# Patient Record
Sex: MALE | Race: WHITE | NOT HISPANIC OR LATINO | Employment: FULL TIME | URBAN - METROPOLITAN AREA
[De-identification: names, ages, dates, MRNs, and addresses within clinical notes are randomized per-mention and may not be internally consistent; named-entity substitution may affect disease eponyms.]

---

## 2017-01-27 ENCOUNTER — ALLSCRIPTS OFFICE VISIT (OUTPATIENT)
Dept: OTHER | Facility: OTHER | Age: 32
End: 2017-01-27

## 2017-06-14 ENCOUNTER — ALLSCRIPTS OFFICE VISIT (OUTPATIENT)
Dept: OTHER | Facility: OTHER | Age: 32
End: 2017-06-14

## 2017-10-16 ENCOUNTER — ALLSCRIPTS OFFICE VISIT (OUTPATIENT)
Dept: OTHER | Facility: OTHER | Age: 32
End: 2017-10-16

## 2017-10-18 NOTE — PROGRESS NOTES
Assessment  1  Fibromyalgia (729 1) (M79 7)   2  Elevated blood pressure reading (796 2) (R03 0)    Plan  Fibromyalgia    · Follow-up visit in 3 months Evaluation and Treatment  Follow-up  Status: Hold For -  Scheduling  Requested for: [id number]  SocHx: Current some day smoker    · Chantix Continuing Month Willie 1 MG Oral Tablet    Discussion/Summary    Discussed patient's fibromyalgia as medications in detail today  He is well controlled on current regimen and he will call when he needs any more refills  Regarding his elevated blood pressure, he has a family history and has recently gained weight and so I told him to watch with his diet as well as the need for exercise and weight loss to help to decrease his risk and he has eliminated the risk factor of tobacco use which I commended him for  We will monitor his blood pressure more closely and re-evaluate him when he is next due to follow up in the office in 6 months, sooner kathleen Fam Possible side effects of new medications were reviewed with the patient/guardian today  The treatment plan was reviewed with the patient/guardian  The patient/guardian understands and agrees with the treatment plan      Chief Complaint  Patient presents for medication check  Patient states he does not need any refills on medications  Patient is not longer taking Chantix   Patient is here today for follow up of chronic conditions described in HPI  History of Present Illness  Patient presents for follow-up of fibromyalgia  He is feeling well overall today without any specific complaints  He is taking duloxetine, tramadol, and Vicodin for the symptoms and he has no issues and does not need any refills today  He quit smoking approximately 2 months ago on Chantix  He still smokes an occasional cigar  He was not aware until today that he has gained some weight  Review of Systems    Constitutional: No fever or chills, feels well, no tiredness, no recent weight gain or weight loss  Cardiovascular: No complaints of slow heart rate, no fast heart rate, no chest pain, no palpitations, no leg claudication, no lower extremity  Respiratory: No complaints of shortness of breath, no wheezing, no cough, no SOB on exertion, no orthopnea or PND  Gastrointestinal: No complaints of abdominal pain, no constipation, no nausea or vomiting, no diarrhea or bloody stools  Genitourinary: No complaints of dysuria, no incontinence, no hesitancy, no nocturia, no genital lesion, no testicular pain  Musculoskeletal: No complaints of arthralgia, no myalgias, no joint swelling or stiffness, no limb pain or swelling  Active Problems  1  Asthma (493 90) (J45 909)   2  Starr-Danlos syndrome (756 83) (Q79 6)   3  Fibromyalgia (729 1) (M79 7)    Past Medical History  1  History of Encounter for smoking cessation counseling (V65 42,305 1) (Z71 6,Z72 0)   2  History of Fatigue (780 79) (R53 83)   3  History of Flu vaccine need (V04 81) (Z23)   4  History of abdominal pain (V13 89) (Z87 898)   5  History of nausea (V12 79) (Z87 898)   6  History of Laceration of right side of back, initial encounter (876 0) (S21 211A)   7  History of Laceration of right side of back, subsequent encounter (V58 89,876 0)   (S21 211D)   8  History of Need for diphtheria-tetanus-pertussis (Tdap) vaccine, adult/adolescent (V06 1)   (Z23)   9  History of Skin lesion (709 9) (L98 9)   10  History of Visit For:   6  History of Visit For:    Family History  Maternal Grandmother    1  Family history of hypertension (V17 49) (Z82 49)    Social History   · Current some day smoker (305 1) (F17 200)   · Social alcohol use (Z78 9)  The social history was reviewed and is unchanged  Current Meds   1  Benadryl 25 MG TABS; TAKE 1 TABLET AT BEDTIME; Therapy: [id number] to Recorded   2  Chantix Continuing Month Willie 1 MG Oral Tablet; take one tablet by mouth twice daily;    Therapy: 11WIR8917 to (96 198752)  Requested for: 10Aug2017; Last   Rx:10Aug2017 Ordered   3  DULoxetine HCl - 60 MG Oral Capsule Delayed Release Particles; TAKE 1 CAPSULE   DAILY; Therapy: 55TIO8033 to (Sloan Castro)  Requested for: 10Aug2017; Last   Rx:10Aug2017 Ordered   4  Excedrin Migraine 250-250-65 MG Oral Tablet; TAKE 3 TABLET Daily; Therapy: 54GVZ6192 to Recorded   5  Hydrocodone-Acetaminophen  MG Oral Tablet; TAKE 1 TABLET EVERY 4 TO 6   HOURS AS NEEDED FOR PAIN;   Therapy: 15Pzc4443 to (Evaluate:19Oct2017); Last Rx:11Oct2017 Ordered   6  TraMADol HCl - 50 MG Oral Tablet; TAKE 2 TABLET Every 6 hours PRN; Therapy: 70NCE8155 to (Evaluate:10Nov2017)  Requested for: 00XFE8963; Last   Rx:11Oct2017 Ordered   7  Ventolin  (90 Base) MCG/ACT Inhalation Aerosol Solution; INHALE 1 TO 2 PUFFS   EVERY 4 TO 6 HOURS AS NEEDED; Therapy: 21FRV9796 to (Last IV:23BDF9683)  Requested for: 77FBF7307 Ordered    The medication list was reviewed and updated today  Allergies  1  Clindamycin   2  Erythromycin Base TABS    Vitals  Vital Signs    Recorded: 80MGM6675 04:20PM Recorded: 61PBM0658 04:05PM   Temperature  97 7 F, Tympanic   Heart Rate  86   Pulse Quality  Normal   Respiration Quality  Normal   Respiration  12   Systolic 855 609, RUE, Sitting   Diastolic 98 78, RUE, Sitting   Height  5 ft 9 69 in   Weight  194 lb 8 oz   BMI Calculated  28 16   BSA Calculated  2 06   O2 Saturation  96, RA   Pain Scale  0     Physical Exam    Constitutional   General appearance: No acute distress, well appearing and well nourished  Ears, Nose, Mouth, and Throat   Oropharynx: Normal with no erythema, edema, exudate or lesions  Pulmonary   Respiratory effort: No increased work of breathing or signs of respiratory distress  Auscultation of lungs: Clear to auscultation, equal breath sounds bilaterally, no wheezes, no rales, no rhonci  Cardiovascular   Auscultation of heart: Normal rate and rhythm, normal S1 and S2, without murmurs      Examination of extremities for edema and/or varicosities: Normal     Lymphatic   Palpation of lymph nodes in neck: No lymphadenopathy  Psychiatric   Orientation to person, place and time: Normal     Mood and affect: Normal     Additional Exam:  Vital signs reviewed; neck supple, no thyromegaly  Signatures   Electronically signed by :  Neville Bentley [facility]; Oct 16 2017  4:33PM EST                       (Author)    Electronically signed by : Bethanie Victoria DO; Oct 17 2017  5:56AM EST                       (Co-author)

## 2018-01-14 VITALS
HEIGHT: 60 IN | RESPIRATION RATE: 12 BRPM | WEIGHT: 186.38 LBS | BODY MASS INDEX: 36.59 KG/M2 | SYSTOLIC BLOOD PRESSURE: 128 MMHG | OXYGEN SATURATION: 97 % | DIASTOLIC BLOOD PRESSURE: 70 MMHG | HEART RATE: 99 BPM | TEMPERATURE: 96.5 F

## 2018-01-14 VITALS
OXYGEN SATURATION: 98 % | SYSTOLIC BLOOD PRESSURE: 144 MMHG | TEMPERATURE: 98.5 F | RESPIRATION RATE: 14 BRPM | BODY MASS INDEX: 26.51 KG/M2 | DIASTOLIC BLOOD PRESSURE: 88 MMHG | HEART RATE: 87 BPM | WEIGHT: 185.19 LBS | HEIGHT: 70 IN

## 2018-01-14 VITALS
RESPIRATION RATE: 12 BRPM | DIASTOLIC BLOOD PRESSURE: 98 MMHG | SYSTOLIC BLOOD PRESSURE: 136 MMHG | OXYGEN SATURATION: 96 % | BODY MASS INDEX: 27.84 KG/M2 | HEIGHT: 70 IN | WEIGHT: 194.5 LBS | TEMPERATURE: 97.7 F | HEART RATE: 86 BPM

## 2018-02-11 DIAGNOSIS — M79.7 FIBROMYALGIA: Primary | ICD-10-CM

## 2018-02-11 RX ORDER — TRAMADOL HYDROCHLORIDE 50 MG/1
100 TABLET ORAL EVERY 6 HOURS PRN
Qty: 240 TABLET | Refills: 0 | OUTPATIENT
Start: 2018-02-11 | End: 2018-03-15 | Stop reason: SDUPTHER

## 2018-02-11 RX ORDER — DULOXETIN HYDROCHLORIDE 60 MG/1
1 CAPSULE, DELAYED RELEASE ORAL DAILY
COMMUNITY
Start: 2014-09-09 | End: 2018-02-11 | Stop reason: SDUPTHER

## 2018-02-11 RX ORDER — TRAMADOL HYDROCHLORIDE 50 MG/1
2 TABLET ORAL EVERY 6 HOURS PRN
COMMUNITY
Start: 2015-02-12 | End: 2018-02-11 | Stop reason: SDUPTHER

## 2018-02-11 RX ORDER — DULOXETIN HYDROCHLORIDE 60 MG/1
60 CAPSULE, DELAYED RELEASE ORAL DAILY
Qty: 30 CAPSULE | Refills: 2 | Status: SHIPPED | OUTPATIENT
Start: 2018-02-11 | End: 2018-03-15 | Stop reason: SDUPTHER

## 2018-02-15 DIAGNOSIS — M79.7 FIBROMYALGIA: ICD-10-CM

## 2018-02-16 RX ORDER — DULOXETIN HYDROCHLORIDE 60 MG/1
CAPSULE, DELAYED RELEASE ORAL
Qty: 30 CAPSULE | Refills: 2 | OUTPATIENT
Start: 2018-02-16

## 2018-02-16 RX ORDER — TRAMADOL HYDROCHLORIDE 50 MG/1
TABLET ORAL
Qty: 240 TABLET | Refills: 0 | OUTPATIENT
Start: 2018-02-16

## 2018-03-15 ENCOUNTER — OFFICE VISIT (OUTPATIENT)
Dept: FAMILY MEDICINE CLINIC | Facility: CLINIC | Age: 33
End: 2018-03-15
Payer: COMMERCIAL

## 2018-03-15 VITALS
TEMPERATURE: 98.2 F | DIASTOLIC BLOOD PRESSURE: 88 MMHG | SYSTOLIC BLOOD PRESSURE: 136 MMHG | HEART RATE: 125 BPM | BODY MASS INDEX: 29.06 KG/M2 | HEIGHT: 69 IN | WEIGHT: 196.2 LBS | OXYGEN SATURATION: 97 %

## 2018-03-15 DIAGNOSIS — M79.7 FIBROMYALGIA: Primary | ICD-10-CM

## 2018-03-15 DIAGNOSIS — Q79.60 EHLERS-DANLOS SYNDROME: ICD-10-CM

## 2018-03-15 DIAGNOSIS — Z72.0 TOBACCO USE: ICD-10-CM

## 2018-03-15 PROBLEM — R03.0 ELEVATED BLOOD PRESSURE READING: Status: ACTIVE | Noted: 2017-10-16

## 2018-03-15 PROCEDURE — 99214 OFFICE O/P EST MOD 30 MIN: CPT | Performed by: PHYSICIAN ASSISTANT

## 2018-03-15 RX ORDER — ALBUTEROL SULFATE 90 UG/1
2 AEROSOL, METERED RESPIRATORY (INHALATION) AS NEEDED
COMMUNITY
Start: 2012-10-10 | End: 2020-07-13 | Stop reason: SDUPTHER

## 2018-03-15 RX ORDER — VARENICLINE TARTRATE 25 MG
KIT ORAL
Qty: 53 TABLET | Refills: 0 | Status: SHIPPED | OUTPATIENT
Start: 2018-03-15 | End: 2019-05-14

## 2018-03-15 RX ORDER — ACETAMINOPHEN 325 MG/1
TABLET ORAL
COMMUNITY
End: 2018-10-19 | Stop reason: SDUPTHER

## 2018-03-15 RX ORDER — DULOXETIN HYDROCHLORIDE 60 MG/1
60 CAPSULE, DELAYED RELEASE ORAL DAILY
Qty: 30 CAPSULE | Refills: 3 | Status: SHIPPED | OUTPATIENT
Start: 2018-03-15 | End: 2018-04-12 | Stop reason: SDUPTHER

## 2018-03-15 RX ORDER — ELETRIPTAN HYDROBROMIDE 40 MG/1
20 TABLET, FILM COATED ORAL
COMMUNITY
End: 2019-05-14

## 2018-03-15 RX ORDER — SUMATRIPTAN 50 MG/1
TABLET, FILM COATED ORAL
COMMUNITY
Start: 2011-01-10 | End: 2019-05-14

## 2018-03-15 RX ORDER — HYDROCODONE BITARTRATE AND ACETAMINOPHEN 10; 325 MG/1; MG/1
TABLET ORAL
Qty: 30 TABLET | Refills: 0 | Status: SHIPPED | OUTPATIENT
Start: 2018-03-15 | End: 2018-04-12 | Stop reason: SDUPTHER

## 2018-03-15 RX ORDER — ACETAMINOPHEN, ASPIRIN AND CAFFEINE 250; 250; 65 MG/1; MG/1; MG/1
3 TABLET, FILM COATED ORAL DAILY
COMMUNITY
Start: 2014-07-01

## 2018-03-15 RX ORDER — OMEPRAZOLE 20 MG/1
TABLET, DELAYED RELEASE ORAL
COMMUNITY
End: 2018-03-15 | Stop reason: ALTCHOICE

## 2018-03-15 RX ORDER — TRAMADOL HYDROCHLORIDE 50 MG/1
100 TABLET ORAL EVERY 6 HOURS PRN
Qty: 240 TABLET | Refills: 0 | Status: SHIPPED | OUTPATIENT
Start: 2018-03-15 | End: 2018-04-12 | Stop reason: SDUPTHER

## 2018-03-15 RX ORDER — HYDROCODONE BITARTRATE AND ACETAMINOPHEN 10; 325 MG/1; MG/1
1 TABLET ORAL
COMMUNITY
Start: 2014-04-13 | End: 2018-03-15 | Stop reason: SDUPTHER

## 2018-03-15 RX ORDER — VARENICLINE TARTRATE 1 MG/1
1 TABLET, FILM COATED ORAL 2 TIMES DAILY
COMMUNITY
Start: 2013-03-06 | End: 2018-03-15 | Stop reason: ALTCHOICE

## 2018-03-15 RX ORDER — DIPHENHYDRAMINE HCL 25 MG
1 CAPSULE ORAL DAILY
COMMUNITY
Start: 2014-07-01

## 2018-03-15 NOTE — PROGRESS NOTES
Assessment/Plan:         Diagnoses and all orders for this visit:    Fibromyalgia  -     DULoxetine (CYMBALTA) 60 mg delayed release capsule; Take 1 capsule (60 mg total) by mouth daily  -     traMADol (ULTRAM) 50 mg tablet; Take 2 tablets (100 mg total) by mouth every 6 (six) hours as needed for moderate pain  -     HYDROcodone-acetaminophen (NORCO)  mg per tablet; Earliest Fill Date: 3/15/18 Take 1 tablet every 4-6 hrs as needed for pain  Starr-Danlos syndrome    Tobacco use  -     varenicline (CHANTIX ELIJAH) 0 5 MG X 11 & 1 MG X 42 tablet; Take one 0 5mg tablet by mouth once daily for 3 days, then increase to one 0 5mg tablet twice daily for 3 days, then increase to one 1mg tablet twice daily  Discussed pt's chronic conditions, meds, and assessed his present condition today  His fibromyalgia pain is overall controlled on current med regimen  I will refill his Duloxetine, Tramadol, and Vicodin today  He only uses the Vicodin when the pain is severe  I will restart him on Chantix for smoking cessation  He is to call one month after starting with update on his progress and can then refill for him  I again discussed diet, exercise, weight loss with him  He is to RTO in 3 months, sooner PRN if any issues arise  Chief Complaint   Patient presents with    Follow-up     Med check       Subjective:      Patient ID: Rome Balderas is a 29 y o  male  Pt presents for F/U of fibromyalgia, Starr-Danlos syndrome, tobacco use  He is doing well overall and reports the medications he is taking for pain have it fairly well controlled overall  He needs several med refills today  He reports that he would like to be restarted on Chantix to help him quit smoking and also knows that he needs to improve his diet and exercise  He has no new complaints at today's visit           The following portions of the patient's history were reviewed and updated as appropriate: allergies, current medications, past family history, past medical history, past social history, past surgical history and problem list     Review of Systems   Constitutional: Negative  Respiratory: Negative  Cardiovascular: Negative  Gastrointestinal: Negative  Genitourinary: Negative  Objective:      /88 (BP Location: Left arm, Patient Position: Sitting)   Pulse (!) 125   Temp 98 2 °F (36 8 °C) (Tympanic)   Ht 5' 9" (1 753 m)   Wt 89 kg (196 lb 3 2 oz)   SpO2 97%   BMI 28 97 kg/m²          Physical Exam   Constitutional: He is oriented to person, place, and time  He appears well-developed and well-nourished  No distress  HENT:   Mouth/Throat: Oropharynx is clear and moist    Neck: Neck supple  No thyromegaly present  Cardiovascular: Normal rate, regular rhythm and normal heart sounds  Pulmonary/Chest: Effort normal and breath sounds normal    Lymphadenopathy:     He has no cervical adenopathy  Neurological: He is alert and oriented to person, place, and time  Psychiatric: He has a normal mood and affect  Vitals reviewed

## 2018-04-12 DIAGNOSIS — M79.7 FIBROMYALGIA: ICD-10-CM

## 2018-04-12 RX ORDER — TRAMADOL HYDROCHLORIDE 50 MG/1
100 TABLET ORAL EVERY 6 HOURS PRN
Qty: 240 TABLET | Refills: 0 | Status: SHIPPED | OUTPATIENT
Start: 2018-04-12 | End: 2018-05-17 | Stop reason: SDUPTHER

## 2018-04-12 RX ORDER — DULOXETIN HYDROCHLORIDE 60 MG/1
60 CAPSULE, DELAYED RELEASE ORAL DAILY
Qty: 30 CAPSULE | Refills: 0 | Status: SHIPPED | OUTPATIENT
Start: 2018-04-12 | End: 2018-05-17 | Stop reason: SDUPTHER

## 2018-04-12 RX ORDER — HYDROCODONE BITARTRATE AND ACETAMINOPHEN 10; 325 MG/1; MG/1
TABLET ORAL
Qty: 30 TABLET | Refills: 0 | Status: SHIPPED | OUTPATIENT
Start: 2018-04-12 | End: 2018-05-17 | Stop reason: SDUPTHER

## 2018-04-12 NOTE — TELEPHONE ENCOUNTER
Pt would like written rxs for tramadol, duloxentine and vicodin  They were sent to the wrong pharmacy the last time, please print all rxs

## 2018-05-17 DIAGNOSIS — M79.7 FIBROMYALGIA: ICD-10-CM

## 2018-05-17 RX ORDER — HYDROCODONE BITARTRATE AND ACETAMINOPHEN 10; 325 MG/1; MG/1
TABLET ORAL
Qty: 30 TABLET | Refills: 0 | Status: SHIPPED | OUTPATIENT
Start: 2018-05-17 | End: 2018-07-23 | Stop reason: SDUPTHER

## 2018-05-17 RX ORDER — TRAMADOL HYDROCHLORIDE 50 MG/1
100 TABLET ORAL EVERY 6 HOURS PRN
Qty: 240 TABLET | Refills: 0 | Status: SHIPPED | OUTPATIENT
Start: 2018-05-17 | End: 2018-06-19 | Stop reason: SDUPTHER

## 2018-05-17 RX ORDER — DULOXETIN HYDROCHLORIDE 60 MG/1
60 CAPSULE, DELAYED RELEASE ORAL DAILY
Qty: 30 CAPSULE | Refills: 1 | Status: SHIPPED | OUTPATIENT
Start: 2018-05-17 | End: 2018-07-23 | Stop reason: SDUPTHER

## 2018-06-19 DIAGNOSIS — M79.7 FIBROMYALGIA: ICD-10-CM

## 2018-06-19 RX ORDER — TRAMADOL HYDROCHLORIDE 50 MG/1
100 TABLET ORAL EVERY 6 HOURS PRN
Qty: 240 TABLET | Refills: 0 | Status: SHIPPED | OUTPATIENT
Start: 2018-06-19 | End: 2018-07-23 | Stop reason: SDUPTHER

## 2018-07-23 ENCOUNTER — TELEPHONE (OUTPATIENT)
Dept: FAMILY MEDICINE CLINIC | Facility: CLINIC | Age: 33
End: 2018-07-23

## 2018-07-23 ENCOUNTER — OFFICE VISIT (OUTPATIENT)
Dept: FAMILY MEDICINE CLINIC | Facility: CLINIC | Age: 33
End: 2018-07-23
Payer: COMMERCIAL

## 2018-07-23 VITALS
BODY MASS INDEX: 28.47 KG/M2 | RESPIRATION RATE: 15 BRPM | SYSTOLIC BLOOD PRESSURE: 136 MMHG | HEIGHT: 69 IN | WEIGHT: 192.2 LBS | OXYGEN SATURATION: 99 % | DIASTOLIC BLOOD PRESSURE: 82 MMHG | HEART RATE: 71 BPM | TEMPERATURE: 97.8 F

## 2018-07-23 DIAGNOSIS — M79.7 FIBROMYALGIA: ICD-10-CM

## 2018-07-23 PROCEDURE — 99213 OFFICE O/P EST LOW 20 MIN: CPT | Performed by: PHYSICIAN ASSISTANT

## 2018-07-23 RX ORDER — DULOXETIN HYDROCHLORIDE 60 MG/1
60 CAPSULE, DELAYED RELEASE ORAL DAILY
Qty: 30 CAPSULE | Refills: 3 | Status: CANCELLED | OUTPATIENT
Start: 2018-07-23

## 2018-07-23 RX ORDER — DULOXETIN HYDROCHLORIDE 60 MG/1
60 CAPSULE, DELAYED RELEASE ORAL DAILY
Qty: 30 CAPSULE | Refills: 5 | Status: SHIPPED | OUTPATIENT
Start: 2018-07-23 | End: 2018-12-30 | Stop reason: SDUPTHER

## 2018-07-23 RX ORDER — TRAMADOL HYDROCHLORIDE 50 MG/1
100 TABLET ORAL EVERY 6 HOURS PRN
Qty: 240 TABLET | Refills: 0 | Status: CANCELLED | OUTPATIENT
Start: 2018-07-23

## 2018-07-23 RX ORDER — TRAMADOL HYDROCHLORIDE 50 MG/1
100 TABLET ORAL EVERY 6 HOURS PRN
Qty: 240 TABLET | Refills: 0 | OUTPATIENT
Start: 2018-07-23

## 2018-07-23 RX ORDER — TRAMADOL HYDROCHLORIDE 50 MG/1
100 TABLET ORAL EVERY 6 HOURS PRN
Qty: 240 TABLET | Refills: 0 | Status: SHIPPED | OUTPATIENT
Start: 2018-07-23 | End: 2018-08-22 | Stop reason: SDUPTHER

## 2018-07-23 RX ORDER — HYDROCODONE BITARTRATE AND ACETAMINOPHEN 10; 325 MG/1; MG/1
TABLET ORAL
Qty: 30 TABLET | Refills: 0 | Status: SHIPPED | OUTPATIENT
Start: 2018-07-23 | End: 2019-07-17 | Stop reason: ALTCHOICE

## 2018-07-23 NOTE — TELEPHONE ENCOUNTER
Jessie bella Lazar is calling [address] [place] [address] to release the medication   traMADol (ULTRAM) 50 mg tablet  For the patient  [facility] won't release it to the patient unless they get a phone call from us

## 2018-07-23 NOTE — PROGRESS NOTES
Assessment/Plan:         Diagnoses and all orders for this visit:    Fibromyalgia  -     HYDROcodone-acetaminophen (NORCO)  mg per tablet; Earliest Fill Date: 7/23/18 Take 1 tablet every 4-6 hrs as needed for pain  -     DULoxetine (CYMBALTA) 60 mg delayed release capsule; Take 1 capsule (60 mg total) by mouth daily  -     traMADol (ULTRAM) 50 mg tablet; Take 2 tablets (100 mg total) by mouth every 6 (six) hours as needed for moderate pain    Other orders  -     Cancel: traMADol (ULTRAM) 50 mg tablet; Take 2 tablets (100 mg total) by mouth every 6 (six) hours as needed for moderate pain  -     Cancel: DULoxetine (CYMBALTA) 60 mg delayed release capsule; Take 1 capsule (60 mg total) by mouth daily      Discussed pt's fibromyalgia and med regimen with him today  He is out of his meds and as a result his symptoms have increased  Will refill his Duloxetine, Tramadol, and Vicodin today  He is otherwise doing well  He is to RTO in 3 months, sooner PRN if any issues arise  Chief Complaint   Patient presents with    Follow-up     med check       Subjective:      Patient ID: Jose Hoff is a 34 y o  male  Pt presents for F/U of fibromyalgia  He reports that he is having increased symptoms due to recently running out of his meds and is not doing well and needs them refilled  He denies any new complaints today  He reports he has been trying to lose weight  The following portions of the patient's history were reviewed and updated as appropriate:   He  has a past medical history of Fatigue  He   Patient Active Problem List    Diagnosis Date Noted    Elevated blood pressure reading 10/16/2017    Starr-Danlos syndrome 07/01/2013    Asthma 10/10/2012    Fibromyalgia 10/01/2012    Esophagitis 03/16/2011     He  has no past surgical history on file  His family history includes Hypertension in his maternal grandmother  He  reports that he has been smoking    He has never used smokeless tobacco  He reports that he drinks alcohol  He reports that he does not use drugs  Current Outpatient Prescriptions   Medication Sig Dispense Refill    albuterol (VENTOLIN HFA) 90 mcg/act inhaler Inhale 2 puffs      aspirin-acetaminophen-caffeine (EXCEDRIN MIGRAINE) 250-250-65 MG per tablet Take 3 tablets by mouth daily      diphenhydrAMINE (BENADRYL) 25 mg capsule Take 1 tablet by mouth      DULoxetine (CYMBALTA) 60 mg delayed release capsule Take 1 capsule (60 mg total) by mouth daily 30 capsule 5    HYDROcodone-acetaminophen (NORCO)  mg per tablet Earliest Fill Date: 7/23/18 Take 1 tablet every 4-6 hrs as needed for pain  30 tablet 0    traMADol (ULTRAM) 50 mg tablet Take 2 tablets (100 mg total) by mouth every 6 (six) hours as needed for moderate pain 240 tablet 0    acetaminophen (TYLENOL) 325 mg tablet Take by mouth      eletriptan (RELPAX) 40 MG tablet Take 20 mg by mouth      SUMAtriptan (IMITREX) 50 mg tablet Take by mouth      varenicline (CHANTIX ELIJAH) 0 5 MG X 11 & 1 MG X 42 tablet Take one 0 5mg tablet by mouth once daily for 3 days, then increase to one 0 5mg tablet twice daily for 3 days, then increase to one 1mg tablet twice daily  53 tablet 0     No current facility-administered medications for this visit  Current Outpatient Prescriptions on File Prior to Visit   Medication Sig    albuterol (VENTOLIN HFA) 90 mcg/act inhaler Inhale 2 puffs    aspirin-acetaminophen-caffeine (EXCEDRIN MIGRAINE) 250-250-65 MG per tablet Take 3 tablets by mouth daily    diphenhydrAMINE (BENADRYL) 25 mg capsule Take 1 tablet by mouth    acetaminophen (TYLENOL) 325 mg tablet Take by mouth    eletriptan (RELPAX) 40 MG tablet Take 20 mg by mouth    SUMAtriptan (IMITREX) 50 mg tablet Take by mouth    varenicline (CHANTIX ELIJAH) 0 5 MG X 11 & 1 MG X 42 tablet Take one 0 5mg tablet by mouth once daily for 3 days, then increase to one 0 5mg tablet twice daily for 3 days, then increase to one 1mg tablet twice daily  No current facility-administered medications on file prior to visit  He is allergic to clindamycin and erythromycin       Review of Systems   Constitutional: Negative  Respiratory: Negative  Cardiovascular: Negative  Gastrointestinal: Negative  Genitourinary: Negative  Musculoskeletal:        Pain secondary to fibromyalgia          Objective:      /82 (BP Location: Left arm, Patient Position: Sitting, Cuff Size: Adult)   Pulse 71   Temp 97 8 °F (36 6 °C) (Tympanic)   Resp 15   Ht 5' 9" (1 753 m)   Wt 87 2 kg (192 lb 3 2 oz)   SpO2 99%   BMI 28 38 kg/m²          Physical Exam   Constitutional: He is oriented to person, place, and time  He appears well-developed and well-nourished  No distress  Cardiovascular: Normal rate, regular rhythm and normal heart sounds  Pulmonary/Chest: Effort normal and breath sounds normal    Neurological: He is alert and oriented to person, place, and time  Psychiatric: He has a normal mood and affect  Vitals reviewed

## 2018-08-22 DIAGNOSIS — M79.7 FIBROMYALGIA: ICD-10-CM

## 2018-08-22 RX ORDER — TRAMADOL HYDROCHLORIDE 50 MG/1
100 TABLET ORAL EVERY 6 HOURS PRN
Qty: 240 TABLET | Refills: 0 | Status: SHIPPED | OUTPATIENT
Start: 2018-08-22 | End: 2018-09-21 | Stop reason: SDUPTHER

## 2018-08-22 NOTE — TELEPHONE ENCOUNTER
Pt called [facility]   Pt's last office visit was 3/2018

## 2018-09-21 DIAGNOSIS — M79.7 FIBROMYALGIA: ICD-10-CM

## 2018-09-21 RX ORDER — TRAMADOL HYDROCHLORIDE 50 MG/1
100 TABLET ORAL EVERY 6 HOURS PRN
Qty: 240 TABLET | Refills: 0 | Status: SHIPPED | OUTPATIENT
Start: 2018-09-21 | End: 2018-10-19 | Stop reason: SDUPTHER

## 2018-10-19 DIAGNOSIS — M79.7 FIBROMYALGIA: ICD-10-CM

## 2018-10-19 RX ORDER — TRAMADOL HYDROCHLORIDE 50 MG/1
100 TABLET ORAL EVERY 6 HOURS PRN
Qty: 240 TABLET | Refills: 0 | Status: SHIPPED | OUTPATIENT
Start: 2018-10-19 | End: 2018-11-20 | Stop reason: SDUPTHER

## 2018-10-19 RX ORDER — ACETAMINOPHEN 325 MG/1
325 TABLET ORAL EVERY 4 HOURS PRN
Qty: 30 TABLET | Refills: 0 | Status: SHIPPED | OUTPATIENT
Start: 2018-10-19 | End: 2018-12-30 | Stop reason: SDUPTHER

## 2018-11-20 DIAGNOSIS — M79.7 FIBROMYALGIA: ICD-10-CM

## 2018-11-20 RX ORDER — TRAMADOL HYDROCHLORIDE 50 MG/1
100 TABLET ORAL EVERY 6 HOURS PRN
Qty: 240 TABLET | Refills: 0 | Status: SHIPPED | OUTPATIENT
Start: 2018-11-20 | End: 2018-12-21 | Stop reason: SDUPTHER

## 2018-12-21 DIAGNOSIS — M79.7 FIBROMYALGIA: ICD-10-CM

## 2018-12-21 RX ORDER — TRAMADOL HYDROCHLORIDE 50 MG/1
100 TABLET ORAL EVERY 6 HOURS PRN
Qty: 56 TABLET | Refills: 0 | Status: SHIPPED | OUTPATIENT
Start: 2018-12-21 | End: 2018-12-30 | Stop reason: SDUPTHER

## 2018-12-30 ENCOUNTER — OFFICE VISIT (OUTPATIENT)
Dept: FAMILY MEDICINE CLINIC | Facility: CLINIC | Age: 33
End: 2018-12-30
Payer: COMMERCIAL

## 2018-12-30 VITALS
SYSTOLIC BLOOD PRESSURE: 126 MMHG | DIASTOLIC BLOOD PRESSURE: 86 MMHG | WEIGHT: 179.1 LBS | TEMPERATURE: 97.7 F | BODY MASS INDEX: 26.45 KG/M2 | HEART RATE: 92 BPM | OXYGEN SATURATION: 97 %

## 2018-12-30 DIAGNOSIS — Q79.60 EHLERS-DANLOS SYNDROME: ICD-10-CM

## 2018-12-30 DIAGNOSIS — M79.7 FIBROMYALGIA: Primary | ICD-10-CM

## 2018-12-30 DIAGNOSIS — Z72.0 TOBACCO USE: ICD-10-CM

## 2018-12-30 PROCEDURE — 99214 OFFICE O/P EST MOD 30 MIN: CPT | Performed by: FAMILY MEDICINE

## 2018-12-30 RX ORDER — GABAPENTIN 600 MG/1
600 TABLET ORAL 3 TIMES DAILY
Qty: 90 TABLET | Refills: 1 | Status: SHIPPED | OUTPATIENT
Start: 2018-12-30 | End: 2019-03-11 | Stop reason: SDUPTHER

## 2018-12-30 RX ORDER — ACETAMINOPHEN 325 MG/1
325 TABLET ORAL EVERY 4 HOURS PRN
Qty: 30 TABLET | Refills: 0 | Status: SHIPPED | OUTPATIENT
Start: 2018-12-30 | End: 2019-05-14

## 2018-12-30 RX ORDER — DULOXETIN HYDROCHLORIDE 30 MG/1
CAPSULE, DELAYED RELEASE ORAL
Qty: 30 CAPSULE | Refills: 0 | Status: SHIPPED | OUTPATIENT
Start: 2018-12-30 | End: 2019-01-31 | Stop reason: ALTCHOICE

## 2018-12-30 RX ORDER — TRAMADOL HYDROCHLORIDE 50 MG/1
100 TABLET ORAL EVERY 6 HOURS PRN
Qty: 240 TABLET | Refills: 0 | Status: SHIPPED | OUTPATIENT
Start: 2018-12-30 | End: 2019-01-31 | Stop reason: SDUPTHER

## 2018-12-30 RX ORDER — GABAPENTIN 300 MG/1
300 CAPSULE ORAL 3 TIMES DAILY
Qty: 30 CAPSULE | Refills: 0 | Status: SHIPPED | OUTPATIENT
Start: 2018-12-30 | End: 2019-01-31 | Stop reason: ALTCHOICE

## 2018-12-30 RX ORDER — OMEPRAZOLE 20 MG/1
20 TABLET, DELAYED RELEASE ORAL DAILY
COMMUNITY
End: 2019-05-14

## 2018-12-30 NOTE — PATIENT INSTRUCTIONS
Taper off duloxetine take 30 mg - one tablet daily for a week, then one tablet every other day for 10 days, then can stop  Start Gabapentin 300 mg - one three times a day for ten days, then go to 600 mg three times a day

## 2018-12-30 NOTE — PROGRESS NOTES
Assessment/Plan:  Patient is a 81-year-old male with fibromyalgia and Starr-Danlos syndrome  He has been on chronic pain medication for some time  He missed his follow-up appointment in October  He admits he is not very good about scheduling these  I told him he needs to schedule his follow-up appointment when he is leaving today  He does take tramadol to tablets every 6 hours  He asked about changing to gabapentin as he feels that the Loxitane is not helping him as much as it had  We will have to see if he actually needs the duloxetine for signs and symptoms of depression  I did write to decrease the duloxetine to 30 mg a day and then every other day and while he is tapering off that he can start gabapentin 300 mg 3 times a day and when he is finished with the duloxetine he can increase his gabapentin to 600 mg 3 times a day  I advised patient that he should not be having any alcohol with these medications  He does continue to smoke    I will see him back in a month to see how he is doing with these medications  Diagnoses and all orders for this visit:    Fibromyalgia  -     acetaminophen (TYLENOL) 325 mg tablet; Take 1 tablet (325 mg total) by mouth every 4 (four) hours as needed for mild pain  -     gabapentin (NEURONTIN) 300 mg capsule; Take 1 capsule (300 mg total) by mouth 3 (three) times a day  -     traMADol (ULTRAM) 50 mg tablet; Take 2 tablets (100 mg total) by mouth every 6 (six) hours as needed for moderate pain  -     gabapentin (NEURONTIN) 600 MG tablet; Take 1 tablet (600 mg total) by mouth 3 (three) times a day Start after finish 300 mg prescription   -     DULoxetine (CYMBALTA) 30 mg delayed release capsule; Take one daily for a week and then one every other day for ten days      Tobacco use    Starr-Danlos syndrome          Subjective:   Chief Complaint   Patient presents with    Follow-up     med check, needs tylenol and would like to discuss changing duloxetine to neurontin Patient ID: Nora Hutchison is a 34 y o  male  Patient is seen regarding fibromyalgia  Has pain almost everywhere - ankles, knees , back, arms, etc   Left hand and wrist are bothering her more that he can't sleep  He feels he would like to try Gabapentin instead of duloxetine  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  Is smoking - one cigar a day  Alcohol - a couple beer a night  The following portions of the patient's history were reviewed and updated as appropriate: allergies, current medications, past family history, past medical history, past social history, past surgical history and problem list     Review of Systems   Constitutional: Negative for chills and fever  HENT: Negative for congestion and sore throat  Respiratory: Negative for chest tightness  Cardiovascular: Negative for chest pain and palpitations  Gastrointestinal: Negative for abdominal pain, constipation, diarrhea and nausea  Genitourinary: Negative for difficulty urinating  Musculoskeletal: Positive for arthralgias and myalgias  Skin: Negative  Neurological: Negative for dizziness and headaches  Psychiatric/Behavioral: Negative  Objective:      /86 (BP Location: Left arm, Patient Position: Sitting)   Pulse 92   Temp 97 7 °F (36 5 °C) (Tympanic)   Wt 81 2 kg (179 lb 1 6 oz)   SpO2 97%   BMI 26 45 kg/m²          Physical Exam   Constitutional: He is oriented to person, place, and time  He appears well-developed  No distress  Neck: Carotid bruit is not present  No thyromegaly present  Cardiovascular: Normal rate, regular rhythm and normal heart sounds  Pulmonary/Chest: Effort normal and breath sounds normal    Musculoskeletal: He exhibits tenderness (left wrist and hand)  He exhibits no edema  Lymphadenopathy:     He has no cervical adenopathy  Neurological: He is alert and oriented to person, place, and time  Skin: Skin is warm and dry     Psychiatric: He has a normal mood and affect  Nursing note and vitals reviewed

## 2019-01-31 ENCOUNTER — OFFICE VISIT (OUTPATIENT)
Dept: FAMILY MEDICINE CLINIC | Facility: CLINIC | Age: 34
End: 2019-01-31
Payer: COMMERCIAL

## 2019-01-31 VITALS
OXYGEN SATURATION: 99 % | SYSTOLIC BLOOD PRESSURE: 126 MMHG | WEIGHT: 183.7 LBS | RESPIRATION RATE: 16 BRPM | BODY MASS INDEX: 27.84 KG/M2 | TEMPERATURE: 98.1 F | DIASTOLIC BLOOD PRESSURE: 92 MMHG | HEIGHT: 68 IN | HEART RATE: 86 BPM

## 2019-01-31 DIAGNOSIS — M79.7 FIBROMYALGIA: ICD-10-CM

## 2019-01-31 DIAGNOSIS — R03.0 ELEVATED BLOOD PRESSURE READING: ICD-10-CM

## 2019-01-31 DIAGNOSIS — Q79.60 EHLERS-DANLOS SYNDROME: Primary | ICD-10-CM

## 2019-01-31 PROCEDURE — 3008F BODY MASS INDEX DOCD: CPT | Performed by: FAMILY MEDICINE

## 2019-01-31 PROCEDURE — 99213 OFFICE O/P EST LOW 20 MIN: CPT | Performed by: FAMILY MEDICINE

## 2019-01-31 RX ORDER — TRAMADOL HYDROCHLORIDE 50 MG/1
100 TABLET ORAL EVERY 6 HOURS PRN
Qty: 240 TABLET | Refills: 0 | Status: SHIPPED | OUTPATIENT
Start: 2019-01-31 | End: 2019-03-01 | Stop reason: SDUPTHER

## 2019-01-31 NOTE — PROGRESS NOTES
Assessment/Plan:  Patient is a 68-year-old male with fibromyalgia and ear down less syndrome  He does work a physical job and has aches and pains  Today he complains left-sided rib pain  He has run out tramadol  Takes 3 in the morning and 3 in the afternoon and 2 at bedtime  I had increased his gabapentin at the last visit  He feels he is doing better with this  He is no longer taking the duloxetine  I did renew his tramadol today and he will return in 3 months  He understands that he must be seen routinely for us to prescribe this medication  Patient is still smoking  He tells me that he smokes cigars at work only  I did tell him that this does affect his blood pressure  We will follow his blood pressure and see what it is at his next visit  Diagnoses and all orders for this visit:    Starr-Danlos syndrome    Fibromyalgia  -     traMADol (ULTRAM) 50 mg tablet; Take 2 tablets (100 mg total) by mouth every 6 (six) hours as needed for moderate pain    Elevated blood pressure reading          Subjective:   Chief Complaint   Patient presents with    Follow-up     pt presents for f/u and med check        Patient ID: Ananth Kwon is a 34 y o  male  Patient is here to follow up on pain medication - he feels that Gabapentin is helping more than Duloxetine  His hand pain is better  He still takes 8 Tramadol a day - three in AM, Three in afternoon and two at bedtime  He hurts all over  Smoke cigars every day  The following portions of the patient's history were reviewed and updated as appropriate: allergies, current medications, past family history, past medical history, past social history, past surgical history and problem list     Review of Systems   Constitutional: Negative for chills and fever  HENT: Negative for congestion and sore throat  Respiratory: Negative for chest tightness  Cardiovascular: Negative for chest pain and palpitations     Gastrointestinal: Negative for abdominal pain, constipation, diarrhea and nausea  Genitourinary: Negative for difficulty urinating  Musculoskeletal: Positive for arthralgias and myalgias  Skin: Negative  Neurological: Negative for dizziness and headaches  Psychiatric/Behavioral: Negative  Objective:      /92 (BP Location: Left arm, Patient Position: Sitting, Cuff Size: Standard)   Pulse 86   Temp 98 1 °F (36 7 °C) (Tympanic)   Resp 16   Ht 5' 7 72" (1 72 m)   Wt 83 3 kg (183 lb 11 2 oz)   SpO2 99%   BMI 28 17 kg/m²          Physical Exam   Constitutional: He is oriented to person, place, and time  He appears well-developed  No distress  Cardiovascular: Normal rate and regular rhythm  Pulmonary/Chest: Effort normal and breath sounds normal    Seems to be taking a less deep breath  Abdominal:   Tenderness on left side  Neurological: He is alert and oriented to person, place, and time  Skin: Skin is warm and dry  Psychiatric: He has a normal mood and affect  Nursing note and vitals reviewed

## 2019-03-01 DIAGNOSIS — M79.7 FIBROMYALGIA: ICD-10-CM

## 2019-03-01 RX ORDER — TRAMADOL HYDROCHLORIDE 50 MG/1
100 TABLET ORAL EVERY 6 HOURS PRN
Qty: 240 TABLET | Refills: 0 | Status: SHIPPED | OUTPATIENT
Start: 2019-03-01 | End: 2019-04-01 | Stop reason: SDUPTHER

## 2019-03-11 DIAGNOSIS — M79.7 FIBROMYALGIA: ICD-10-CM

## 2019-03-12 RX ORDER — GABAPENTIN 600 MG/1
600 TABLET ORAL 3 TIMES DAILY
Qty: 90 TABLET | Refills: 1 | OUTPATIENT
Start: 2019-03-12

## 2019-03-12 RX ORDER — GABAPENTIN 600 MG/1
TABLET ORAL
Qty: 90 TABLET | Refills: 1 | Status: SHIPPED | OUTPATIENT
Start: 2019-03-12 | End: 2019-05-09 | Stop reason: SDUPTHER

## 2019-04-01 DIAGNOSIS — M79.7 FIBROMYALGIA: ICD-10-CM

## 2019-04-02 RX ORDER — TRAMADOL HYDROCHLORIDE 50 MG/1
100 TABLET ORAL EVERY 6 HOURS PRN
Qty: 240 TABLET | Refills: 0 | Status: SHIPPED | OUTPATIENT
Start: 2019-04-02 | End: 2019-04-29 | Stop reason: SDUPTHER

## 2019-04-29 DIAGNOSIS — M79.7 FIBROMYALGIA: ICD-10-CM

## 2019-04-30 RX ORDER — TRAMADOL HYDROCHLORIDE 50 MG/1
100 TABLET ORAL EVERY 6 HOURS PRN
Qty: 240 TABLET | Refills: 0 | Status: SHIPPED | OUTPATIENT
Start: 2019-04-30 | End: 2019-05-31 | Stop reason: SDUPTHER

## 2019-05-09 DIAGNOSIS — M79.7 FIBROMYALGIA: ICD-10-CM

## 2019-05-09 RX ORDER — GABAPENTIN 600 MG/1
600 TABLET ORAL 3 TIMES DAILY
Qty: 90 TABLET | Refills: 1 | Status: SHIPPED | OUTPATIENT
Start: 2019-05-09 | End: 2019-07-15 | Stop reason: SDUPTHER

## 2019-05-14 ENCOUNTER — OFFICE VISIT (OUTPATIENT)
Dept: FAMILY MEDICINE CLINIC | Facility: CLINIC | Age: 34
End: 2019-05-14
Payer: COMMERCIAL

## 2019-05-14 VITALS
DIASTOLIC BLOOD PRESSURE: 90 MMHG | BODY MASS INDEX: 27.4 KG/M2 | WEIGHT: 185 LBS | TEMPERATURE: 97.8 F | SYSTOLIC BLOOD PRESSURE: 156 MMHG | RESPIRATION RATE: 19 BRPM | HEART RATE: 85 BPM | OXYGEN SATURATION: 98 % | HEIGHT: 69 IN

## 2019-05-14 DIAGNOSIS — M79.7 FIBROMYALGIA: Primary | ICD-10-CM

## 2019-05-14 DIAGNOSIS — R03.0 ELEVATED BLOOD PRESSURE READING: ICD-10-CM

## 2019-05-14 PROCEDURE — 3008F BODY MASS INDEX DOCD: CPT | Performed by: NURSE PRACTITIONER

## 2019-05-14 PROCEDURE — 99213 OFFICE O/P EST LOW 20 MIN: CPT | Performed by: NURSE PRACTITIONER

## 2019-05-14 RX ORDER — GABAPENTIN 100 MG/1
100 CAPSULE ORAL
Qty: 30 CAPSULE | Refills: 0 | Status: SHIPPED | OUTPATIENT
Start: 2019-05-14 | End: 2019-07-17 | Stop reason: SDUPTHER

## 2019-05-31 DIAGNOSIS — M79.7 FIBROMYALGIA: ICD-10-CM

## 2019-05-31 RX ORDER — TRAMADOL HYDROCHLORIDE 50 MG/1
100 TABLET ORAL EVERY 6 HOURS PRN
Qty: 240 TABLET | Refills: 0 | Status: SHIPPED | OUTPATIENT
Start: 2019-05-31 | End: 2019-06-28 | Stop reason: SDUPTHER

## 2019-06-28 DIAGNOSIS — M79.7 FIBROMYALGIA: ICD-10-CM

## 2019-06-30 RX ORDER — TRAMADOL HYDROCHLORIDE 50 MG/1
100 TABLET ORAL EVERY 6 HOURS PRN
Qty: 240 TABLET | Refills: 0 | Status: SHIPPED | OUTPATIENT
Start: 2019-06-30 | End: 2019-07-31 | Stop reason: SDUPTHER

## 2019-07-15 DIAGNOSIS — M79.7 FIBROMYALGIA: ICD-10-CM

## 2019-07-15 RX ORDER — GABAPENTIN 600 MG/1
600 TABLET ORAL 3 TIMES DAILY
Qty: 90 TABLET | Refills: 1 | Status: SHIPPED | OUTPATIENT
Start: 2019-07-15 | End: 2019-09-17 | Stop reason: SDUPTHER

## 2019-07-15 RX ORDER — GABAPENTIN 600 MG/1
TABLET ORAL
Qty: 90 TABLET | Refills: 1 | Status: SHIPPED | OUTPATIENT
Start: 2019-07-15 | End: 2019-07-17

## 2019-07-17 ENCOUNTER — OFFICE VISIT (OUTPATIENT)
Dept: FAMILY MEDICINE CLINIC | Facility: CLINIC | Age: 34
End: 2019-07-17
Payer: COMMERCIAL

## 2019-07-17 VITALS
DIASTOLIC BLOOD PRESSURE: 84 MMHG | WEIGHT: 183 LBS | HEIGHT: 69 IN | OXYGEN SATURATION: 96 % | SYSTOLIC BLOOD PRESSURE: 122 MMHG | TEMPERATURE: 98.4 F | RESPIRATION RATE: 16 BRPM | HEART RATE: 90 BPM | BODY MASS INDEX: 27.11 KG/M2

## 2019-07-17 DIAGNOSIS — Z13.6 SCREENING FOR CARDIOVASCULAR CONDITION: ICD-10-CM

## 2019-07-17 DIAGNOSIS — M79.7 FIBROMYALGIA: ICD-10-CM

## 2019-07-17 DIAGNOSIS — Z13.220 SCREENING FOR LIPID DISORDERS: ICD-10-CM

## 2019-07-17 DIAGNOSIS — Z13.1 SCREENING FOR DIABETES MELLITUS: ICD-10-CM

## 2019-07-17 DIAGNOSIS — Z71.6 ENCOUNTER FOR SMOKING CESSATION COUNSELING: ICD-10-CM

## 2019-07-17 DIAGNOSIS — Z13.29 SCREENING FOR THYROID DISORDER: ICD-10-CM

## 2019-07-17 DIAGNOSIS — M25.532 LEFT WRIST PAIN: Primary | ICD-10-CM

## 2019-07-17 PROCEDURE — 4004F PT TOBACCO SCREEN RCVD TLK: CPT | Performed by: NURSE PRACTITIONER

## 2019-07-17 PROCEDURE — 99213 OFFICE O/P EST LOW 20 MIN: CPT | Performed by: NURSE PRACTITIONER

## 2019-07-17 PROCEDURE — 3008F BODY MASS INDEX DOCD: CPT | Performed by: NURSE PRACTITIONER

## 2019-07-17 RX ORDER — VARENICLINE TARTRATE 25 MG
KIT ORAL
Qty: 53 TABLET | Refills: 0 | Status: SHIPPED | OUTPATIENT
Start: 2019-07-17 | End: 2019-08-15 | Stop reason: DRUGHIGH

## 2019-07-17 RX ORDER — GABAPENTIN 100 MG/1
100 CAPSULE ORAL
Qty: 90 CAPSULE | Refills: 0 | Status: SHIPPED | OUTPATIENT
Start: 2019-07-17 | End: 2019-10-30 | Stop reason: SDUPTHER

## 2019-07-17 NOTE — PROGRESS NOTES
[facility]    ASSESSMENT AND PLAN     1  Left wrist pain  Presents today with complaint of ongoing left wrist pain  Has noticed some improvement at HS since increasing Neurontin by 100 mg  With still wakes up with pain in the wrist and into the middle finger  Positive Phalen's test today  Discussed options  Would prefer to not make any medication adjustments at this time, trial noninvasive treatment  Recommend PT at this time  Patient agreeable  Referral placed  - Ambulatory referral to Physical Therapy; Future    2  Encounter for smoking cessation counseling  Patient interested in smoking cessation  Currently smoking 1 pack per day  Has successfully quit in past with Chantix  Did have vivid dreams at times, but no other side effects  Will trial again  Rx given  Dosing use reviewed  He is to discontinue medication and contact the office immediately should he have any negative side effects     - varenicline (CHANTIX ELIJAH) 0 5 MG X 11 & 1 MG X 42 tablet; Take one 0 5mg tab by mouth 1x daily for 3 days, then increase to one 0 5mg tab 2x daily for 3 days, then increase to one 1mg tab 2x daily  Dispense: 53 tablet; Refill: 0    3  Fibromyalgia  Will continue with the 700 of Neurontin at HS     - gabapentin (NEURONTIN) 100 mg capsule; Take 1 capsule (100 mg total) by mouth daily at bedtime  Dispense: 90 capsule; Refill: 0    4  Screening for cardiovascular condition  No screening/routine lab work since 2015  Will obtain for baseline    - CBC and differential; Future    5  Screening for diabetes mellitus    - Comprehensive metabolic panel; Future    6  Screening for lipid disorders    - Lipid panel; Future    7  Screening for thyroid disorder    - TSH, 3rd generation with Free T4 reflex; Future            SUBJECTIVE       Patient ID: Leoncio Capps is a 30 y o  male      Chief Complaint   Patient presents with    Follow-up     meds check up       HISTORY OF PRESENT ILLNESS    Patient presents today for a re-evaluate of right wrist pain  He has been taking the extra 100 mg of Neurontin at night and does feel it is helping, but has not completely eliminate pain  When he wakes up the as cramping in the wrist and usually in the pain keep ring and middle fingers  He has a tingling sensation  He would also like to discuss quitting smoking  He is currently smoking 1 pack per day  The following portions of the patient's history were reviewed and updated as appropriate: allergies, current medications, past family history, past medical history, past social history, past surgical history and problem list     REVIEW OF SYSTEMS  Review of Systems   Constitutional: Negative  Musculoskeletal: Positive for joint swelling (Left wrist)  Psychiatric/Behavioral: Negative          OBJECTIVE      VITAL SIGNS  /84 (BP Location: Left arm, Patient Position: Sitting, Cuff Size: Adult)   Pulse 90   Temp 98 4 °F (36 9 °C) (Tympanic)   Resp 16   Ht 5' 8 5" (1 74 m)   Wt 83 kg (183 lb)   SpO2 96%   BMI 27 42 kg/m²     CURRENT MEDICATIONS    Current Outpatient Medications:     albuterol (VENTOLIN HFA) 90 mcg/act inhaler, Inhale 2 puffs as needed , Disp: , Rfl:     aspirin-acetaminophen-caffeine (EXCEDRIN MIGRAINE) 250-250-65 MG per tablet, Take 3 tablets by mouth daily, Disp: , Rfl:     diphenhydrAMINE (BENADRYL) 25 mg capsule, Take 1 tablet by mouth daily , Disp: , Rfl:     gabapentin (NEURONTIN) 100 mg capsule, Take 1 capsule (100 mg total) by mouth daily at bedtime, Disp: 90 capsule, Rfl: 0    gabapentin (NEURONTIN) 600 MG tablet, Take 1 tablet (600 mg total) by mouth 3 (three) times a day, Disp: 90 tablet, Rfl: 1    traMADol (ULTRAM) 50 mg tablet, Take 2 tablets (100 mg total) by mouth every 6 (six) hours as needed for moderate pain, Disp: 240 tablet, Rfl: 0    varenicline (CHANTIX ELIJAH) 0 5 MG X 11 & 1 MG X 42 tablet, Take one 0 5mg tab by mouth 1x daily for 3 days, then increase to one 0 5mg tab 2x daily for 3 days, then increase to one 1mg tab 2x daily, Disp: 53 tablet, Rfl: 0      PHYSICAL EXAMINATION   Physical Exam   Constitutional: He is oriented to person, place, and time  He appears well-developed and well-nourished  Musculoskeletal:        Left wrist: He exhibits normal range of motion, no tenderness and no swelling  Positive Phalen   Neurological: He is alert and oriented to person, place, and time  Nursing note and vitals reviewed

## 2019-07-31 DIAGNOSIS — M79.7 FIBROMYALGIA: ICD-10-CM

## 2019-08-01 RX ORDER — TRAMADOL HYDROCHLORIDE 50 MG/1
100 TABLET ORAL EVERY 6 HOURS PRN
Qty: 240 TABLET | Refills: 0 | Status: SHIPPED | OUTPATIENT
Start: 2019-08-01 | End: 2019-08-29 | Stop reason: SDUPTHER

## 2019-08-15 DIAGNOSIS — Z71.6 ENCOUNTER FOR SMOKING CESSATION COUNSELING: ICD-10-CM

## 2019-08-15 RX ORDER — VARENICLINE TARTRATE 25 MG
KIT ORAL
Qty: 53 TABLET | Refills: 0 | Status: CANCELLED | OUTPATIENT
Start: 2019-08-15

## 2019-08-15 RX ORDER — VARENICLINE TARTRATE 1 MG/1
1 TABLET, FILM COATED ORAL 2 TIMES DAILY
Qty: 60 TABLET | Refills: 1 | Status: SHIPPED | OUTPATIENT
Start: 2019-08-15 | End: 2019-11-18 | Stop reason: SDUPTHER

## 2019-08-29 DIAGNOSIS — M79.7 FIBROMYALGIA: ICD-10-CM

## 2019-08-30 RX ORDER — TRAMADOL HYDROCHLORIDE 50 MG/1
100 TABLET ORAL EVERY 6 HOURS PRN
Qty: 240 TABLET | Refills: 0 | Status: SHIPPED | OUTPATIENT
Start: 2019-08-30 | End: 2019-10-02 | Stop reason: SDUPTHER

## 2019-09-17 DIAGNOSIS — M79.7 FIBROMYALGIA: ICD-10-CM

## 2019-09-17 RX ORDER — GABAPENTIN 600 MG/1
600 TABLET ORAL 3 TIMES DAILY
Qty: 90 TABLET | Refills: 1 | Status: SHIPPED | OUTPATIENT
Start: 2019-09-17 | End: 2019-11-18 | Stop reason: SDUPTHER

## 2019-10-02 DIAGNOSIS — M79.7 FIBROMYALGIA: ICD-10-CM

## 2019-10-02 RX ORDER — TRAMADOL HYDROCHLORIDE 50 MG/1
100 TABLET ORAL EVERY 6 HOURS PRN
Qty: 60 TABLET | Refills: 0 | Status: SHIPPED | OUTPATIENT
Start: 2019-10-02 | End: 2019-10-30 | Stop reason: SDUPTHER

## 2019-10-02 RX ORDER — TRAMADOL HYDROCHLORIDE 50 MG/1
TABLET ORAL
Qty: 180 TABLET | Refills: 0 | Status: SHIPPED | OUTPATIENT
Start: 2019-10-09 | End: 2019-10-30 | Stop reason: SDUPTHER

## 2019-10-02 NOTE — TELEPHONE ENCOUNTER
Pt states he is tight on money and would like to know if he can have one Rx for quantity of 60 and one Rx quantity 180?

## 2019-10-30 DIAGNOSIS — M79.7 FIBROMYALGIA: ICD-10-CM

## 2019-10-30 RX ORDER — TRAMADOL HYDROCHLORIDE 50 MG/1
100 TABLET ORAL EVERY 6 HOURS PRN
Qty: 240 TABLET | Refills: 0 | Status: SHIPPED | OUTPATIENT
Start: 2019-10-30 | End: 2019-11-29 | Stop reason: SDUPTHER

## 2019-10-30 RX ORDER — GABAPENTIN 100 MG/1
100 CAPSULE ORAL
Qty: 90 CAPSULE | Refills: 0 | Status: SHIPPED | OUTPATIENT
Start: 2019-10-30 | End: 2020-02-03 | Stop reason: SDUPTHER

## 2019-11-18 DIAGNOSIS — Z71.6 ENCOUNTER FOR SMOKING CESSATION COUNSELING: ICD-10-CM

## 2019-11-18 DIAGNOSIS — M79.7 FIBROMYALGIA: ICD-10-CM

## 2019-11-20 RX ORDER — VARENICLINE TARTRATE 1 MG/1
1 TABLET, FILM COATED ORAL 2 TIMES DAILY
Qty: 60 TABLET | Refills: 1 | Status: SHIPPED | OUTPATIENT
Start: 2019-11-20 | End: 2020-07-24 | Stop reason: SDDI

## 2019-11-20 RX ORDER — GABAPENTIN 600 MG/1
600 TABLET ORAL 3 TIMES DAILY
Qty: 90 TABLET | Refills: 1 | Status: SHIPPED | OUTPATIENT
Start: 2019-11-20 | End: 2020-01-20 | Stop reason: SDUPTHER

## 2019-11-21 RX ORDER — VARENICLINE TARTRATE 1 MG/1
1 TABLET, FILM COATED ORAL 2 TIMES DAILY
Qty: 60 TABLET | Refills: 1 | Status: CANCELLED | OUTPATIENT
Start: 2019-11-21

## 2019-11-29 DIAGNOSIS — M79.7 FIBROMYALGIA: ICD-10-CM

## 2019-11-29 RX ORDER — TRAMADOL HYDROCHLORIDE 50 MG/1
100 TABLET ORAL EVERY 6 HOURS PRN
Qty: 240 TABLET | Refills: 0 | Status: SHIPPED | OUTPATIENT
Start: 2019-11-29 | End: 2019-12-30 | Stop reason: SDUPTHER

## 2019-12-02 ENCOUNTER — TELEPHONE (OUTPATIENT)
Dept: FAMILY MEDICINE CLINIC | Facility: CLINIC | Age: 34
End: 2019-12-02

## 2019-12-30 DIAGNOSIS — M79.7 FIBROMYALGIA: ICD-10-CM

## 2019-12-30 RX ORDER — TRAMADOL HYDROCHLORIDE 50 MG/1
100 TABLET ORAL EVERY 6 HOURS PRN
Qty: 240 TABLET | Refills: 0 | Status: SHIPPED | OUTPATIENT
Start: 2020-01-02 | End: 2020-02-03 | Stop reason: SDUPTHER

## 2020-01-15 ENCOUNTER — TELEPHONE (OUTPATIENT)
Dept: FAMILY MEDICINE CLINIC | Facility: CLINIC | Age: 35
End: 2020-01-15

## 2020-01-15 NOTE — TELEPHONE ENCOUNTER
Pt called and wanted to know what the quantity was on his last Rx for Tramadol  I gave Pt info stating we prescribed 240 tablets  [facility] states [facility] tablets  I told [facility] to [facility] first and if they need anything form our office have them call back

## 2020-01-20 DIAGNOSIS — M79.7 FIBROMYALGIA: ICD-10-CM

## 2020-01-20 RX ORDER — GABAPENTIN 600 MG/1
600 TABLET ORAL 3 TIMES DAILY
Qty: 90 TABLET | Refills: 1 | Status: SHIPPED | OUTPATIENT
Start: 2020-01-20 | End: 2020-03-19 | Stop reason: SDUPTHER

## 2020-02-03 DIAGNOSIS — Z71.6 ENCOUNTER FOR SMOKING CESSATION COUNSELING: ICD-10-CM

## 2020-02-03 DIAGNOSIS — M79.7 FIBROMYALGIA: ICD-10-CM

## 2020-02-03 RX ORDER — VARENICLINE TARTRATE 1 MG/1
1 TABLET, FILM COATED ORAL 2 TIMES DAILY
Qty: 60 TABLET | Refills: 0 | OUTPATIENT
Start: 2020-02-03

## 2020-02-03 RX ORDER — GABAPENTIN 100 MG/1
100 CAPSULE ORAL
Qty: 90 CAPSULE | Refills: 0 | Status: SHIPPED | OUTPATIENT
Start: 2020-02-03 | End: 2020-06-05 | Stop reason: SDUPTHER

## 2020-02-03 RX ORDER — TRAMADOL HYDROCHLORIDE 50 MG/1
100 TABLET ORAL EVERY 6 HOURS PRN
Qty: 120 TABLET | Refills: 0 | Status: SHIPPED | OUTPATIENT
Start: 2020-02-03 | End: 2020-02-17 | Stop reason: SDUPTHER

## 2020-02-04 NOTE — TELEPHONE ENCOUNTER
Please call patient  He is overdue for a medication check  I do believe someone already left voicemail regarding that  But he also needs to have his lab work done prior to his office visit  Those fasting orders are in place and he was supposed to get that done at his last visit in July  But appears he has yet to do so  I did refill a partial dose of his medication, but he needs to have his lab work and a follow-up visit, before any more medications can be prescribed

## 2020-02-12 ENCOUNTER — OFFICE VISIT (OUTPATIENT)
Dept: FAMILY MEDICINE CLINIC | Facility: CLINIC | Age: 35
End: 2020-02-12
Payer: COMMERCIAL

## 2020-02-12 ENCOUNTER — APPOINTMENT (OUTPATIENT)
Dept: LAB | Facility: CLINIC | Age: 35
End: 2020-02-12
Payer: COMMERCIAL

## 2020-02-12 VITALS
DIASTOLIC BLOOD PRESSURE: 82 MMHG | HEART RATE: 78 BPM | SYSTOLIC BLOOD PRESSURE: 156 MMHG | WEIGHT: 180 LBS | OXYGEN SATURATION: 98 % | BODY MASS INDEX: 26.66 KG/M2 | TEMPERATURE: 97.4 F | HEIGHT: 69 IN

## 2020-02-12 DIAGNOSIS — Z13.1 SCREENING FOR DIABETES MELLITUS: ICD-10-CM

## 2020-02-12 DIAGNOSIS — R79.89 ELEVATED TSH: Primary | ICD-10-CM

## 2020-02-12 DIAGNOSIS — Z13.6 SCREENING FOR CARDIOVASCULAR CONDITION: ICD-10-CM

## 2020-02-12 DIAGNOSIS — Z13.29 SCREENING FOR THYROID DISORDER: ICD-10-CM

## 2020-02-12 DIAGNOSIS — M79.7 FIBROMYALGIA: ICD-10-CM

## 2020-02-12 DIAGNOSIS — Z13.220 SCREENING FOR LIPID DISORDERS: ICD-10-CM

## 2020-02-12 DIAGNOSIS — H57.11 PAIN OF RIGHT EYE: ICD-10-CM

## 2020-02-12 DIAGNOSIS — J30.2 SEASONAL ALLERGIC RHINITIS, UNSPECIFIED TRIGGER: ICD-10-CM

## 2020-02-12 DIAGNOSIS — Q79.60 EHLERS-DANLOS SYNDROME: Primary | ICD-10-CM

## 2020-02-12 LAB
ALBUMIN SERPL BCP-MCNC: 4.4 G/DL (ref 3.5–5)
ALP SERPL-CCNC: 51 U/L (ref 46–116)
ALT SERPL W P-5'-P-CCNC: 27 U/L (ref 12–78)
ANION GAP SERPL CALCULATED.3IONS-SCNC: 2 MMOL/L (ref 4–13)
AST SERPL W P-5'-P-CCNC: 21 U/L (ref 5–45)
BASOPHILS # BLD AUTO: 0.01 THOUSANDS/ΜL (ref 0–0.1)
BASOPHILS NFR BLD AUTO: 0 % (ref 0–1)
BILIRUB SERPL-MCNC: 0.38 MG/DL (ref 0.2–1)
BUN SERPL-MCNC: 12 MG/DL (ref 5–25)
CALCIUM SERPL-MCNC: 9.5 MG/DL (ref 8.3–10.1)
CHLORIDE SERPL-SCNC: 104 MMOL/L (ref 100–108)
CHOLEST SERPL-MCNC: 228 MG/DL (ref 50–200)
CO2 SERPL-SCNC: 31 MMOL/L (ref 21–32)
CREAT SERPL-MCNC: 0.97 MG/DL (ref 0.6–1.3)
EOSINOPHIL # BLD AUTO: 0.15 THOUSAND/ΜL (ref 0–0.61)
EOSINOPHIL NFR BLD AUTO: 2 % (ref 0–6)
ERYTHROCYTE [DISTWIDTH] IN BLOOD BY AUTOMATED COUNT: 12.6 % (ref 11.6–15.1)
GFR SERPL CREATININE-BSD FRML MDRD: 103 ML/MIN/1.73SQ M
GLUCOSE P FAST SERPL-MCNC: 81 MG/DL (ref 65–99)
HCT VFR BLD AUTO: 39.8 % (ref 36.5–49.3)
HDLC SERPL-MCNC: 87 MG/DL
HGB BLD-MCNC: 13.6 G/DL (ref 12–17)
IMM GRANULOCYTES # BLD AUTO: 0.03 THOUSAND/UL (ref 0–0.2)
IMM GRANULOCYTES NFR BLD AUTO: 0 % (ref 0–2)
LDLC SERPL CALC-MCNC: 114 MG/DL (ref 0–100)
LYMPHOCYTES # BLD AUTO: 2.69 THOUSANDS/ΜL (ref 0.6–4.47)
LYMPHOCYTES NFR BLD AUTO: 38 % (ref 14–44)
MCH RBC QN AUTO: 31.6 PG (ref 26.8–34.3)
MCHC RBC AUTO-ENTMCNC: 34.2 G/DL (ref 31.4–37.4)
MCV RBC AUTO: 93 FL (ref 82–98)
MONOCYTES # BLD AUTO: 0.59 THOUSAND/ΜL (ref 0.17–1.22)
MONOCYTES NFR BLD AUTO: 8 % (ref 4–12)
NEUTROPHILS # BLD AUTO: 3.63 THOUSANDS/ΜL (ref 1.85–7.62)
NEUTS SEG NFR BLD AUTO: 52 % (ref 43–75)
NONHDLC SERPL-MCNC: 141 MG/DL
NRBC BLD AUTO-RTO: 0 /100 WBCS
PLATELET # BLD AUTO: 222 THOUSANDS/UL (ref 149–390)
PMV BLD AUTO: 10.3 FL (ref 8.9–12.7)
POTASSIUM SERPL-SCNC: 3.9 MMOL/L (ref 3.5–5.3)
PROT SERPL-MCNC: 7.2 G/DL (ref 6.4–8.2)
RBC # BLD AUTO: 4.3 MILLION/UL (ref 3.88–5.62)
SODIUM SERPL-SCNC: 137 MMOL/L (ref 136–145)
T4 FREE SERPL-MCNC: 0.95 NG/DL (ref 0.76–1.46)
TRIGL SERPL-MCNC: 134 MG/DL
TSH SERPL DL<=0.05 MIU/L-ACNC: 4.83 UIU/ML (ref 0.36–3.74)
WBC # BLD AUTO: 7.1 THOUSAND/UL (ref 4.31–10.16)

## 2020-02-12 PROCEDURE — 85025 COMPLETE CBC W/AUTO DIFF WBC: CPT

## 2020-02-12 PROCEDURE — 99214 OFFICE O/P EST MOD 30 MIN: CPT | Performed by: NURSE PRACTITIONER

## 2020-02-12 PROCEDURE — 84439 ASSAY OF FREE THYROXINE: CPT

## 2020-02-12 PROCEDURE — 3008F BODY MASS INDEX DOCD: CPT | Performed by: NURSE PRACTITIONER

## 2020-02-12 PROCEDURE — 36415 COLL VENOUS BLD VENIPUNCTURE: CPT

## 2020-02-12 PROCEDURE — 84443 ASSAY THYROID STIM HORMONE: CPT

## 2020-02-12 PROCEDURE — 80061 LIPID PANEL: CPT

## 2020-02-12 PROCEDURE — 80053 COMPREHEN METABOLIC PANEL: CPT

## 2020-02-12 RX ORDER — LORATADINE 10 MG/1
10 TABLET ORAL DAILY
Qty: 90 TABLET | Refills: 1 | Status: SHIPPED | OUTPATIENT
Start: 2020-02-12 | End: 2020-07-24 | Stop reason: SDDI

## 2020-02-12 NOTE — PROGRESS NOTES
[address]    Patient presents today for six-month checkup  Physical assessment and plan today as below  Overdue for lab work  Order still in system  Encouraged compliance today while he is here  Ate a bagel around 4:00 a m , but nothing since  Would rather he obtain his labs now while he is here, since he is so overdue  Overdue for dental for several years  Encouraged compliance  Eye exam 1 year ago  Will be scheduling in the next few days, see below  Admits to restarting periodic cigars  Had been smoke free for roughly 1 month  States he smoking them at work secondary distress  Averaging 1 cigar a day  Cessation encouraged  Reviewed pain management  New agreement signed today  Return in 6 months, sooner if needed    ASSESSMENT AND PLAN     1  Starr-Danlos syndrome  Ongoing, but appears manage well with current regime of tramadol and gabapentin  Takes tramadol 150 in a m , 150 in afternoon and 100 at HS  Gabapentin 600 in a m , 600 in the afternoon and 700 at HS  Continue same regime at this time  New pain agreement signed    2  Fibromyalgia  As above    3  Seasonal allergic rhinitis, unspecified trigger  S/S seasonal rhinitis  No signs of bacteria in today's assessment  Recommend restarting loratadine daily  Rx given    - loratadine (CLARITIN) 10 mg tablet; Take 1 tablet (10 mg total) by mouth daily  Dispense: 90 tablet; Refill: 1    4  Pain of right eye  Complains today of intermittent pain in the right eye for the last 2 weeks  Classifies it as occasionally achy  Denies flashes/floaters/sharp or stabbing pain  Denies any visual disturbance  He does work in a machine shop surrounded by dust, 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  Fluorescence/wood lamp examination today  No signs of scratch or abrasion  Currently due for his annual eye exam   Wears contacts  Has been continuing to wear even with this intermittent pain    Recommend he follow-up as soon as possible with his optometrist, as his this may be secondary to either allergies or I fatigue and may need prescription change  Re-evaluate in the office with any visual changes  Would then refer to Ophthalmology for further evaluation            SUBJECTIVE       Patient ID: Cata Ferrer is a 30 y o  male  Chief Complaint   Patient presents with    Follow-up     med check    Sinus Problem     drainage over last 2-3 weeks    Eye Problem     pain in R eye       HISTORY OF PRESENT ILLNESS    Patient presents today for six-month checkup  He has been doing well with his pain control  States he has been taking his tramadol and gabapentin as prescribed  He has some additional problems to discuss  States he has been having sinus pain/pressure for the last couple weeks  Also noting some right eye pain  That started roughly 2 weeks ago  States it is and achy feeling  Denies anyDenies flashes/floaters/sharp or stabbing pain  Denies any visual disturbance  He does work in a machine shop surrounded by dust, 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  The following portions of the patient's history were reviewed and updated as appropriate: allergies, current medications, past family history, past medical history, past social history, past surgical history and problem list     REVIEW OF SYSTEMS  Review of Systems   Constitutional: Negative  HENT: Positive for congestion and sinus pressure  Eyes: Positive for pain  Negative for photophobia, discharge, redness, itching and visual disturbance  Respiratory: Negative  Cardiovascular: Negative  Gastrointestinal: Negative  Neurological: Negative  Psychiatric/Behavioral: Negative          OBJECTIVE      VITAL SIGNS  /82 (BP Location: Right arm, Patient Position: Sitting, Cuff Size: Adult)   Pulse 78   Temp (!) 97 4 °F (36 3 °C)   Ht 5' 8 5" (1 74 m)   Wt 81 6 kg (180 lb)   SpO2 98%   BMI 26 97 kg/m²     CURRENT MEDICATIONS    Current Outpatient Medications:     albuterol (VENTOLIN HFA) 90 mcg/act inhaler, Inhale 2 puffs as needed , Disp: , Rfl:     aspirin-acetaminophen-caffeine (EXCEDRIN MIGRAINE) 250-250-65 MG per tablet, Take 3 tablets by mouth daily, Disp: , Rfl:     diphenhydrAMINE (BENADRYL) 25 mg capsule, Take 1 tablet by mouth daily , Disp: , Rfl:     gabapentin (NEURONTIN) 100 mg capsule, Take 1 capsule (100 mg total) by mouth daily at bedtime, Disp: 90 capsule, Rfl: 0    gabapentin (NEURONTIN) 600 MG tablet, Take 1 tablet (600 mg total) by mouth 3 (three) times a day, Disp: 90 tablet, Rfl: 1    traMADol (ULTRAM) 50 mg tablet, Take 2 tablets (100 mg total) by mouth every 6 (six) hours as needed for moderate pain, Disp: 120 tablet, Rfl: 0    loratadine (CLARITIN) 10 mg tablet, Take 1 tablet (10 mg total) by mouth daily, Disp: 90 tablet, Rfl: 1    varenicline (CHANTIX) 1 mg tablet, Take 1 tablet (1 mg total) by mouth 2 (two) times a day (Patient not taking: Reported on 2/12/2020), Disp: 60 tablet, Rfl: 1      PHYSICAL EXAMINATION   Physical Exam   Constitutional: He is oriented to person, place, and time  Vital signs are normal  He appears well-developed and well-nourished  HENT:   Right Ear: Tympanic membrane and ear canal normal    Left Ear: Tympanic membrane and ear canal normal    Nose: Nose normal    Mouth/Throat: Oropharynx is clear and moist and mucous membranes are normal    Eyes: Pupils are equal, round, and reactive to light  Conjunctivae and lids are normal  Right eye exhibits no chemosis, no discharge, no exudate and no hordeolum  No foreign body present in the right eye  Neck: Carotid bruit is not present  Cardiovascular: Normal rate and regular rhythm  Negative for lower extremity edema   Pulmonary/Chest: Effort normal and breath sounds normal  No respiratory distress  Lymphadenopathy:        Head (right side): No submandibular and no tonsillar adenopathy present          Head (left side): No submandibular and no tonsillar adenopathy present  He has no cervical adenopathy  Neurological: He is alert and oriented to person, place, and time  Psychiatric: He has a normal mood and affect  Thought content normal    Nursing note and vitals reviewed

## 2020-02-17 ENCOUNTER — TELEPHONE (OUTPATIENT)
Dept: FAMILY MEDICINE CLINIC | Facility: CLINIC | Age: 35
End: 2020-02-17

## 2020-02-17 DIAGNOSIS — M79.7 FIBROMYALGIA: ICD-10-CM

## 2020-02-17 RX ORDER — TRAMADOL HYDROCHLORIDE 50 MG/1
100 TABLET ORAL EVERY 6 HOURS PRN
Qty: 120 TABLET | Refills: 0 | Status: SHIPPED | OUTPATIENT
Start: 2020-02-17 | End: 2020-02-22 | Stop reason: SDUPTHER

## 2020-02-22 DIAGNOSIS — M79.7 FIBROMYALGIA: ICD-10-CM

## 2020-02-22 RX ORDER — TRAMADOL HYDROCHLORIDE 50 MG/1
50 TABLET ORAL EVERY 6 HOURS PRN
Qty: 240 TABLET | Refills: 0 | Status: SHIPPED | OUTPATIENT
Start: 2020-02-22 | End: 2020-03-07 | Stop reason: CLARIF

## 2020-03-07 ENCOUNTER — TELEPHONE (OUTPATIENT)
Dept: FAMILY MEDICINE CLINIC | Facility: CLINIC | Age: 35
End: 2020-03-07

## 2020-03-07 DIAGNOSIS — M79.7 FIBROMYALGIA: ICD-10-CM

## 2020-03-07 RX ORDER — TRAMADOL HYDROCHLORIDE 50 MG/1
100 TABLET ORAL EVERY 6 HOURS PRN
Qty: 240 TABLET | Refills: 0 | Status: SHIPPED | OUTPATIENT
Start: 2020-03-07 | End: 2020-04-06 | Stop reason: SDUPTHER

## 2020-03-07 NOTE — TELEPHONE ENCOUNTER
Pt called requesting refill on Tramadol PDM checked last filled 2/22/20 Q-120 via 30 day supply R-0  Pt had quantity shortage from 2/4/20 we sent in another rx on the 22nd  The sig was 1 tablet every 6 hrs previous sig was 2 tablets every 6 hrs  Pt will be out tomorrow since sig changed that would make last fill a 15 day supply   Please review and send rx to [address] [place] [address] Se

## 2020-03-09 ENCOUNTER — TELEPHONE (OUTPATIENT)
Dept: FAMILY MEDICINE CLINIC | Facility: CLINIC | Age: 35
End: 2020-03-09

## 2020-03-09 NOTE — TELEPHONE ENCOUNTER
Pt's spouse notified, Pt will need to pay for 7 day supply and insurance will  cost on 3/16/2020 for 23 days  Next 30 day refill will be due 4/9/2020

## 2020-03-09 NOTE — TELEPHONE ENCOUNTER
Pt called requesting refill of Tramadol, I spoke with pharmacist who stated this shows as an early refill due to dosage change because of error in directions last month  LMOM informing pt  He is to call back with questions

## 2020-03-19 DIAGNOSIS — M79.7 FIBROMYALGIA: ICD-10-CM

## 2020-03-19 RX ORDER — GABAPENTIN 600 MG/1
600 TABLET ORAL 3 TIMES DAILY
Qty: 90 TABLET | Refills: 1 | Status: SHIPPED | OUTPATIENT
Start: 2020-03-19 | End: 2020-05-17

## 2020-04-06 DIAGNOSIS — M79.7 FIBROMYALGIA: ICD-10-CM

## 2020-04-06 RX ORDER — TRAMADOL HYDROCHLORIDE 50 MG/1
100 TABLET ORAL EVERY 6 HOURS PRN
Qty: 240 TABLET | Refills: 0 | Status: SHIPPED | OUTPATIENT
Start: 2020-04-16 | End: 2020-05-01 | Stop reason: SDUPTHER

## 2020-05-01 DIAGNOSIS — M79.7 FIBROMYALGIA: ICD-10-CM

## 2020-05-01 RX ORDER — TRAMADOL HYDROCHLORIDE 50 MG/1
100 TABLET ORAL EVERY 6 HOURS PRN
Qty: 240 TABLET | Refills: 0 | Status: SHIPPED | OUTPATIENT
Start: 2020-05-06 | End: 2020-06-05 | Stop reason: SDUPTHER

## 2020-05-17 DIAGNOSIS — M79.7 FIBROMYALGIA: ICD-10-CM

## 2020-05-17 RX ORDER — GABAPENTIN 600 MG/1
TABLET ORAL
Qty: 90 TABLET | Refills: 1 | Status: SHIPPED | OUTPATIENT
Start: 2020-05-17 | End: 2020-07-14 | Stop reason: SDUPTHER

## 2020-06-05 DIAGNOSIS — M79.7 FIBROMYALGIA: ICD-10-CM

## 2020-06-05 RX ORDER — TRAMADOL HYDROCHLORIDE 50 MG/1
100 TABLET ORAL EVERY 6 HOURS PRN
Qty: 240 TABLET | Refills: 0 | Status: SHIPPED | OUTPATIENT
Start: 2020-06-05 | End: 2020-07-02 | Stop reason: SDUPTHER

## 2020-06-05 RX ORDER — GABAPENTIN 100 MG/1
100 CAPSULE ORAL
Qty: 90 CAPSULE | Refills: 0 | Status: SHIPPED | OUTPATIENT
Start: 2020-06-05 | End: 2020-10-23 | Stop reason: SDUPTHER

## 2020-07-02 DIAGNOSIS — M79.7 FIBROMYALGIA: ICD-10-CM

## 2020-07-02 RX ORDER — TRAMADOL HYDROCHLORIDE 50 MG/1
100 TABLET ORAL EVERY 6 HOURS PRN
Qty: 240 TABLET | Refills: 0 | Status: SHIPPED | OUTPATIENT
Start: 2020-07-02 | End: 2020-08-03 | Stop reason: SDUPTHER

## 2020-07-13 DIAGNOSIS — J45.909 UNCOMPLICATED ASTHMA, UNSPECIFIED ASTHMA SEVERITY, UNSPECIFIED WHETHER PERSISTENT: Primary | ICD-10-CM

## 2020-07-14 ENCOUNTER — OFFICE VISIT (OUTPATIENT)
Dept: FAMILY MEDICINE CLINIC | Facility: CLINIC | Age: 35
End: 2020-07-14
Payer: COMMERCIAL

## 2020-07-14 VITALS
BODY MASS INDEX: 27 KG/M2 | HEART RATE: 84 BPM | DIASTOLIC BLOOD PRESSURE: 90 MMHG | SYSTOLIC BLOOD PRESSURE: 128 MMHG | HEIGHT: 69 IN | TEMPERATURE: 99.5 F | WEIGHT: 182.3 LBS | OXYGEN SATURATION: 93 %

## 2020-07-14 DIAGNOSIS — M79.7 FIBROMYALGIA: ICD-10-CM

## 2020-07-14 DIAGNOSIS — J45.21 MILD INTERMITTENT ASTHMA WITH EXACERBATION: Primary | ICD-10-CM

## 2020-07-14 DIAGNOSIS — J45.909 UNCOMPLICATED ASTHMA, UNSPECIFIED ASTHMA SEVERITY, UNSPECIFIED WHETHER PERSISTENT: ICD-10-CM

## 2020-07-14 PROCEDURE — 99213 OFFICE O/P EST LOW 20 MIN: CPT | Performed by: FAMILY MEDICINE

## 2020-07-14 PROCEDURE — 3008F BODY MASS INDEX DOCD: CPT | Performed by: FAMILY MEDICINE

## 2020-07-14 RX ORDER — ALBUTEROL SULFATE 90 UG/1
2 AEROSOL, METERED RESPIRATORY (INHALATION) AS NEEDED
Qty: 1 INHALER | Refills: 0 | Status: SHIPPED | OUTPATIENT
Start: 2020-07-14 | End: 2020-07-14 | Stop reason: SDUPTHER

## 2020-07-14 RX ORDER — GABAPENTIN 600 MG/1
600 TABLET ORAL 3 TIMES DAILY
Qty: 90 TABLET | Refills: 1 | Status: SHIPPED | OUTPATIENT
Start: 2020-07-14 | End: 2020-09-21 | Stop reason: SDUPTHER

## 2020-07-14 RX ORDER — ALBUTEROL SULFATE 90 UG/1
2 AEROSOL, METERED RESPIRATORY (INHALATION) AS NEEDED
Qty: 1 INHALER | Refills: 0 | Status: SHIPPED | OUTPATIENT
Start: 2020-07-14 | End: 2020-10-23 | Stop reason: SDUPTHER

## 2020-07-14 RX ORDER — PREDNISONE 10 MG/1
TABLET ORAL
Qty: 30 TABLET | Refills: 0 | Status: SHIPPED | OUTPATIENT
Start: 2020-07-14 | End: 2020-10-23

## 2020-07-14 NOTE — PATIENT INSTRUCTIONS
Over the counter medication - Claritin 10 mg one daily ( can get Loratadine - generic name)                                                    Mucinex (guafenesin) 400 mg BID

## 2020-07-14 NOTE — PROGRESS NOTES
Assessment/Plan:   patient is 28-year-old male seen for fibromyalgia  He is having an exacerbation of his asthma  I did prescribe a tapering dose of prednisone  I discussed the use of his albuterol inhaler  Is not to be used more than 4 times a day and if he is using it daily, he needs to let us know  Unfortunately, he continues to smoke  Diagnoses and all orders for this visit:    Mild intermittent asthma with exacerbation  -     predniSONE 10 mg tablet; Take 5 tabs daily  x 2d, 4 tabs x 2d, 3 tabs x 2d, 2 tabs x 2d, 1 tab x 2d with food    Fibromyalgia  -     gabapentin (NEURONTIN) 600 MG tablet; Take 1 tablet (600 mg total) by mouth 3 (three) times a day    Uncomplicated asthma, unspecified asthma severity, unspecified whether persistent  -     albuterol (Ventolin HFA) 90 mcg/act inhaler; Inhale 2 puffs as needed for wheezing          Subjective:   Chief Complaint   Patient presents with    Follow-up     Med check, sinus issues  Chest congestion at night when sleeping, "sometimes I cant breathe"  States it feels like it is getting worse   Cough     During the night causing pt to use the inhaler  Patient ID: Nora Hutchison is a 37 y o  male  Patient has had a cough for a week, wakes up congested at night and has to use inhaler  Wakes up two to three times to use inhaler during night  Tightness on chest  Cough is productive of clear mucous  Denies fever,chills  The following portions of the patient's history were reviewed and updated as appropriate: allergies, current medications, past family history, past medical history, past social history, past surgical history and problem list     Review of Systems   Constitutional: Negative for chills and fever  HENT: Negative for congestion and sore throat  Respiratory: Positive for cough and wheezing  Negative for chest tightness  Cardiovascular: Negative for chest pain and palpitations  Gastrointestinal: Positive for vomiting  Negative for abdominal pain, constipation, diarrhea and nausea  Genitourinary: Negative for difficulty urinating  Skin: Negative  Neurological: Negative for dizziness and headaches  Psychiatric/Behavioral: Negative  Objective:      /90 (BP Location: Left arm, Patient Position: Sitting, Cuff Size: Adult)   Pulse 84   Temp 99 5 °F (37 5 °C) (Tympanic)   Ht 5' 8 5" (1 74 m)   Wt 82 7 kg (182 lb 4 8 oz)   SpO2 93%   BMI 27 32 kg/m²          Physical Exam   Constitutional: He is oriented to person, place, and time  He appears well-nourished  No distress  HENT:   Right Ear: Tympanic membrane normal    Left Ear: Tympanic membrane normal    Mouth/Throat: No oropharyngeal exudate  Neck: No thyromegaly present  Cardiovascular: Normal rate, regular rhythm and normal heart sounds  Pulmonary/Chest: Effort normal  He has wheezes  Musculoskeletal: He exhibits no edema  Lymphadenopathy:     He has no cervical adenopathy  Neurological: He is alert and oriented to person, place, and time  Skin: Skin is warm and dry  Psychiatric: He has a normal mood and affect  Nursing note and vitals reviewed

## 2020-08-03 DIAGNOSIS — M79.7 FIBROMYALGIA: ICD-10-CM

## 2020-08-03 RX ORDER — TRAMADOL HYDROCHLORIDE 50 MG/1
100 TABLET ORAL EVERY 6 HOURS PRN
Qty: 240 TABLET | Refills: 0 | Status: SHIPPED | OUTPATIENT
Start: 2020-08-03 | End: 2020-08-31 | Stop reason: SDUPTHER

## 2020-08-03 NOTE — TELEPHONE ENCOUNTER
PDMP checked, last filled 7/3/2020    Last appt: 2/12/2020 (6 mon check) 7/14/2020 (nicky/fibromyalgia)  Next appt: none scheduled Discharged

## 2020-08-31 DIAGNOSIS — M79.7 FIBROMYALGIA: ICD-10-CM

## 2020-08-31 RX ORDER — TRAMADOL HYDROCHLORIDE 50 MG/1
100 TABLET ORAL EVERY 6 HOURS PRN
Qty: 240 TABLET | Refills: 0 | Status: SHIPPED | OUTPATIENT
Start: 2020-09-01 | End: 2020-09-29 | Stop reason: SDUPTHER

## 2020-09-21 DIAGNOSIS — M79.7 FIBROMYALGIA: ICD-10-CM

## 2020-09-22 RX ORDER — GABAPENTIN 600 MG/1
600 TABLET ORAL 3 TIMES DAILY
Qty: 90 TABLET | Refills: 1 | Status: SHIPPED | OUTPATIENT
Start: 2020-09-22 | End: 2020-10-23 | Stop reason: SDUPTHER

## 2020-09-29 DIAGNOSIS — M79.7 FIBROMYALGIA: ICD-10-CM

## 2020-09-30 RX ORDER — TRAMADOL HYDROCHLORIDE 50 MG/1
100 TABLET ORAL EVERY 6 HOURS PRN
Qty: 240 TABLET | Refills: 0 | Status: SHIPPED | OUTPATIENT
Start: 2020-09-30 | End: 2020-11-02 | Stop reason: SDUPTHER

## 2020-10-23 ENCOUNTER — OFFICE VISIT (OUTPATIENT)
Dept: FAMILY MEDICINE CLINIC | Facility: CLINIC | Age: 35
End: 2020-10-23
Payer: COMMERCIAL

## 2020-10-23 VITALS
OXYGEN SATURATION: 98 % | SYSTOLIC BLOOD PRESSURE: 140 MMHG | HEART RATE: 87 BPM | DIASTOLIC BLOOD PRESSURE: 86 MMHG | WEIGHT: 181.2 LBS | HEIGHT: 69 IN | BODY MASS INDEX: 26.84 KG/M2 | TEMPERATURE: 97.3 F | RESPIRATION RATE: 16 BRPM

## 2020-10-23 DIAGNOSIS — M79.7 FIBROMYALGIA: ICD-10-CM

## 2020-10-23 DIAGNOSIS — R03.0 ELEVATED BLOOD PRESSURE READING: ICD-10-CM

## 2020-10-23 DIAGNOSIS — R10.84 GENERALIZED ABDOMINAL DISCOMFORT: Primary | ICD-10-CM

## 2020-10-23 DIAGNOSIS — J45.909 UNCOMPLICATED ASTHMA, UNSPECIFIED ASTHMA SEVERITY, UNSPECIFIED WHETHER PERSISTENT: ICD-10-CM

## 2020-10-23 PROCEDURE — 99214 OFFICE O/P EST MOD 30 MIN: CPT | Performed by: FAMILY MEDICINE

## 2020-10-23 PROCEDURE — 4004F PT TOBACCO SCREEN RCVD TLK: CPT | Performed by: FAMILY MEDICINE

## 2020-10-23 RX ORDER — ALBUTEROL SULFATE 90 UG/1
2 AEROSOL, METERED RESPIRATORY (INHALATION) AS NEEDED
Qty: 1 INHALER | Refills: 0 | Status: SHIPPED | OUTPATIENT
Start: 2020-10-23 | End: 2020-12-21 | Stop reason: SDUPTHER

## 2020-10-23 RX ORDER — GABAPENTIN 600 MG/1
600 TABLET ORAL 3 TIMES DAILY
Qty: 90 TABLET | Refills: 1 | Status: SHIPPED | OUTPATIENT
Start: 2020-10-23 | End: 2020-12-21 | Stop reason: SDUPTHER

## 2020-10-23 RX ORDER — GABAPENTIN 100 MG/1
100 CAPSULE ORAL
Qty: 90 CAPSULE | Refills: 0 | Status: SHIPPED | OUTPATIENT
Start: 2020-10-23 | End: 2021-01-19 | Stop reason: SDUPTHER

## 2020-11-02 DIAGNOSIS — M79.7 FIBROMYALGIA: ICD-10-CM

## 2020-11-02 RX ORDER — TRAMADOL HYDROCHLORIDE 50 MG/1
100 TABLET ORAL EVERY 6 HOURS PRN
Qty: 240 TABLET | Refills: 0 | OUTPATIENT
Start: 2020-11-02

## 2020-11-02 RX ORDER — TRAMADOL HYDROCHLORIDE 50 MG/1
100 TABLET ORAL EVERY 6 HOURS PRN
Qty: 240 TABLET | Refills: 0 | Status: SHIPPED | OUTPATIENT
Start: 2020-11-02 | End: 2020-12-01 | Stop reason: SDUPTHER

## 2020-12-01 ENCOUNTER — TELEMEDICINE (OUTPATIENT)
Dept: FAMILY MEDICINE CLINIC | Facility: CLINIC | Age: 35
End: 2020-12-01
Payer: COMMERCIAL

## 2020-12-01 DIAGNOSIS — Z20.822 EXPOSURE TO COVID-19 VIRUS: ICD-10-CM

## 2020-12-01 DIAGNOSIS — R05.9 COUGH: ICD-10-CM

## 2020-12-01 DIAGNOSIS — M79.7 FIBROMYALGIA: ICD-10-CM

## 2020-12-01 DIAGNOSIS — Z20.822 EXPOSURE TO COVID-19 VIRUS: Primary | ICD-10-CM

## 2020-12-01 PROCEDURE — U0003 INFECTIOUS AGENT DETECTION BY NUCLEIC ACID (DNA OR RNA); SEVERE ACUTE RESPIRATORY SYNDROME CORONAVIRUS 2 (SARS-COV-2) (CORONAVIRUS DISEASE [COVID-19]), AMPLIFIED PROBE TECHNIQUE, MAKING USE OF HIGH THROUGHPUT TECHNOLOGIES AS DESCRIBED BY CMS-2020-01-R: HCPCS | Performed by: FAMILY MEDICINE

## 2020-12-01 PROCEDURE — 99214 OFFICE O/P EST MOD 30 MIN: CPT | Performed by: FAMILY MEDICINE

## 2020-12-01 RX ORDER — AZITHROMYCIN 250 MG/1
TABLET, FILM COATED ORAL
Qty: 6 TABLET | Refills: 0 | Status: SHIPPED | OUTPATIENT
Start: 2020-12-01 | End: 2020-12-05

## 2020-12-01 RX ORDER — TRAMADOL HYDROCHLORIDE 50 MG/1
100 TABLET ORAL EVERY 6 HOURS PRN
Qty: 240 TABLET | Refills: 0 | Status: SHIPPED | OUTPATIENT
Start: 2020-12-01 | End: 2021-01-04 | Stop reason: SDUPTHER

## 2020-12-02 LAB — SARS-COV-2 RNA SPEC QL NAA+PROBE: NOT DETECTED

## 2020-12-21 DIAGNOSIS — J45.909 UNCOMPLICATED ASTHMA, UNSPECIFIED ASTHMA SEVERITY, UNSPECIFIED WHETHER PERSISTENT: ICD-10-CM

## 2020-12-21 DIAGNOSIS — M79.7 FIBROMYALGIA: ICD-10-CM

## 2020-12-24 RX ORDER — ALBUTEROL SULFATE 90 UG/1
2 AEROSOL, METERED RESPIRATORY (INHALATION) AS NEEDED
Qty: 1 INHALER | Refills: 0 | Status: SHIPPED | OUTPATIENT
Start: 2020-12-24 | End: 2022-03-25 | Stop reason: SDUPTHER

## 2020-12-24 RX ORDER — GABAPENTIN 600 MG/1
600 TABLET ORAL 3 TIMES DAILY
Qty: 90 TABLET | Refills: 1 | Status: SHIPPED | OUTPATIENT
Start: 2020-12-24 | End: 2021-01-19 | Stop reason: SDUPTHER

## 2021-01-02 DIAGNOSIS — M79.7 FIBROMYALGIA: ICD-10-CM

## 2021-01-02 NOTE — TELEPHONE ENCOUNTER
Pt called requesting refill on:    - tramadol (ultram) 50mg tablet  Takes 2 tablets by mouth every six hours as needed for moderate pain  Qty: 240 tablet    Send to [facility] on [address]

## 2021-01-04 ENCOUNTER — TELEPHONE (OUTPATIENT)
Dept: FAMILY MEDICINE CLINIC | Facility: CLINIC | Age: 36
End: 2021-01-04

## 2021-01-04 DIAGNOSIS — M79.7 FIBROMYALGIA: ICD-10-CM

## 2021-01-04 DIAGNOSIS — Z13.1 SCREENING FOR DIABETES MELLITUS: ICD-10-CM

## 2021-01-04 DIAGNOSIS — Q79.60 EHLERS-DANLOS SYNDROME: Primary | ICD-10-CM

## 2021-01-04 DIAGNOSIS — R79.89 ELEVATED TSH: ICD-10-CM

## 2021-01-04 RX ORDER — TRAMADOL HYDROCHLORIDE 50 MG/1
100 TABLET ORAL EVERY 6 HOURS PRN
Qty: 240 TABLET | Refills: 0 | Status: SHIPPED | OUTPATIENT
Start: 2021-01-04 | End: 2021-01-27 | Stop reason: SDUPTHER

## 2021-01-04 NOTE — TELEPHONE ENCOUNTER
Pt fiance/ spouse called wanting to know what was going on with the medications  I informed her they were sent in  David Bolanos left message on pt's phone  Pt stated the message was supposed to go to her not him because he works nights  And she wants to speak with a manager on how this entire process was handled

## 2021-01-04 NOTE — TELEPHONE ENCOUNTER
Left detailed message on patients voicemail making him aware that he needs to schedule an appt before any more medications will be refilled for him

## 2021-01-04 NOTE — TELEPHONE ENCOUNTER
Left a voicemail for this patient's significant other, Marilynn Smith, at 1PM and just now  Calling per her request to discuss how medication refill was handled

## 2021-01-04 NOTE — TELEPHONE ENCOUNTER
Please call patient  His Rx has been refilled, but he is overdue for a routine checkup  Please assist him in scheduling same prior to his next refill  He should have lab work completed prior as well  Fasting orders placed

## 2021-01-05 NOTE — TELEPHONE ENCOUNTER
Called patient's wife Amrita Stoner) and spoke with her regarding the refill of her 's medication  She stated to me that they were told it would be filled Saturday and the refill was placed on Monday causing frustration  The medication has been refilled and both she and the patient are ok now

## 2021-01-19 ENCOUNTER — TELEPHONE (OUTPATIENT)
Dept: FAMILY MEDICINE CLINIC | Facility: CLINIC | Age: 36
End: 2021-01-19

## 2021-01-19 DIAGNOSIS — M79.7 FIBROMYALGIA: ICD-10-CM

## 2021-01-19 RX ORDER — GABAPENTIN 600 MG/1
600 TABLET ORAL 3 TIMES DAILY
Qty: 42 TABLET | Refills: 0 | Status: SHIPPED | OUTPATIENT
Start: 2021-01-19 | End: 2021-01-27 | Stop reason: SDUPTHER

## 2021-01-19 RX ORDER — GABAPENTIN 100 MG/1
100 CAPSULE ORAL
Qty: 14 CAPSULE | Refills: 0 | Status: SHIPPED | OUTPATIENT
Start: 2021-01-19 | End: 2021-01-27 | Stop reason: SDUPTHER

## 2021-01-19 NOTE — TELEPHONE ENCOUNTER
Pt called is aware needs appt before refill of meds pt is scheduled for 1/27/2021 with tony     Gabapentin 600 mg tab   Take 1 tab by mouth 3 times a day  #42  [facility] [address] [facility] [address]     Gabapentin 100 mg cap  Take 1 tab by mouth @ hs  #14  [facility] [address] [facility]

## 2021-01-27 ENCOUNTER — OFFICE VISIT (OUTPATIENT)
Dept: FAMILY MEDICINE CLINIC | Facility: CLINIC | Age: 36
End: 2021-01-27
Payer: COMMERCIAL

## 2021-01-27 VITALS
HEART RATE: 59 BPM | SYSTOLIC BLOOD PRESSURE: 150 MMHG | TEMPERATURE: 97.9 F | WEIGHT: 180.6 LBS | BODY MASS INDEX: 26.75 KG/M2 | OXYGEN SATURATION: 99 % | DIASTOLIC BLOOD PRESSURE: 98 MMHG | HEIGHT: 69 IN

## 2021-01-27 DIAGNOSIS — R79.89 ELEVATED TSH: ICD-10-CM

## 2021-01-27 DIAGNOSIS — Q79.60 EHLERS-DANLOS SYNDROME: ICD-10-CM

## 2021-01-27 DIAGNOSIS — M79.7 FIBROMYALGIA: Primary | ICD-10-CM

## 2021-01-27 DIAGNOSIS — Z72.0 TOBACCO USE: ICD-10-CM

## 2021-01-27 DIAGNOSIS — J30.2 SEASONAL ALLERGIES: ICD-10-CM

## 2021-01-27 DIAGNOSIS — R03.0 ELEVATED BLOOD PRESSURE READING: ICD-10-CM

## 2021-01-27 PROCEDURE — 3008F BODY MASS INDEX DOCD: CPT | Performed by: FAMILY MEDICINE

## 2021-01-27 PROCEDURE — 99214 OFFICE O/P EST MOD 30 MIN: CPT | Performed by: NURSE PRACTITIONER

## 2021-01-27 RX ORDER — TRAMADOL HYDROCHLORIDE 50 MG/1
100 TABLET ORAL EVERY 6 HOURS PRN
Qty: 240 TABLET | Refills: 0 | Status: SHIPPED | OUTPATIENT
Start: 2021-02-03 | End: 2021-03-04 | Stop reason: SDUPTHER

## 2021-01-27 RX ORDER — GABAPENTIN 600 MG/1
600 TABLET ORAL 3 TIMES DAILY
Qty: 90 TABLET | Refills: 0 | Status: SHIPPED | OUTPATIENT
Start: 2021-01-27 | End: 2021-03-17 | Stop reason: SDUPTHER

## 2021-01-27 RX ORDER — GABAPENTIN 100 MG/1
100 CAPSULE ORAL
Qty: 90 CAPSULE | Refills: 0 | Status: SHIPPED | OUTPATIENT
Start: 2021-01-27 | End: 2021-05-14 | Stop reason: SDUPTHER

## 2021-01-27 RX ORDER — FLUTICASONE PROPIONATE 50 MCG
1 SPRAY, SUSPENSION (ML) NASAL DAILY
Qty: 1 BOTTLE | Refills: 2 | Status: SHIPPED | OUTPATIENT
Start: 2021-01-27 | End: 2021-07-15 | Stop reason: SDUPTHER

## 2021-01-27 NOTE — PROGRESS NOTES
[facility]    ASSESSMENT AND PLAN     1  Fibromyalgia  Appears to be doing realativly well on current treatment  Currently taking ultram as follows: 150mg in AM-150mg at lunch-100mg at HS  PMED verified with no red flags  Patient requesting refill today, so he does not have to call when it is due  Refill sent today with date 2/3,  Note pharmacy: No early refill  Continues with gabapentin as follows:  600 mg in a m  600 mg in afternoon -700 mg at HS  He feels well managed on his current treatment plan  No changes today  Pain agreement up-to-date    - traMADol (ULTRAM) 50 mg tablet; Take 2 tablets (100 mg total) by mouth every 6 (six) hours as needed for moderate pain  Dispense: 240 tablet; Refill: 0  - gabapentin (NEURONTIN) 100 mg capsule; Take 1 capsule (100 mg total) by mouth daily at bedtime  Dispense: 90 capsule; Refill: 0  - gabapentin (NEURONTIN) 600 MG tablet; Take 1 tablet (600 mg total) by mouth 3 (three) times a day  Dispense: 90 tablet; Refill: 0    2  Starr-Danlos syndrome  As above      3  Seasonal allergies  Pt with ongoing allergies  Has been taking Claritin daily and Mucinex PRN  Recommend starting Flonase  Strongly encourage wearing masks with filters at work  (works in [employer])  Consider changing medication is symptoms persist or worsen  - fluticasone (FLONASE) 50 mcg/act nasal spray; 1 spray into each nostril daily  Dispense: 1 Bottle; Refill: 2    4  Tobacco use  Continue smoking 1/2-3/4 ppd  Would like to quit but not yet  Has successfully quit in past with Chantix  Will keep re assessing at each subsequent visit  5  Elevated TSH  TSH slightly elevated at last lab draw in 2/2020  Has yet to complete repeat  Recommend doing so now  Await results    6  Elevated blood pressure reading  BP elevated at several visit, today included   Pt does comes to appts right after work and is experiencing increased stress lately due to house hunting and having to stay with friends  Will have him return 2 weeks for nurse visit - not immediately after work - to check BP  If remains elevated, may need to consider low dose medication  SUBJECTIVE       Patient ID: Vipin Diaz is a 37 y o  male  Chief Complaint   Patient presents with    Follow-up     med check- needs refills       HISTORY OF PRESENT ILLNESS    Presents today for routine follow up  Ongoing allergy symptoms  Taking daily Claritin  Currently in middle of moving  Staying temporaily with friends while finding new home  Somewhat stressful at times  No other new healthcare concerns        The following portions of the patient's history were reviewed and updated as appropriate: allergies, current medications, past family history, past medical history, past social history, past surgical history and problem list     REVIEW OF SYSTEMS  Review of Systems   Constitutional: Negative  HENT: Positive for congestion, postnasal drip, rhinorrhea and sinus pressure  Respiratory: Negative  Cardiovascular: Negative  Gastrointestinal: Negative  Genitourinary: Negative  Musculoskeletal: Positive for arthralgias and myalgias  Ongoing/chronic   Neurological: Positive for headaches (sinus)  Psychiatric/Behavioral: Negative          OBJECTIVE      VITAL SIGNS  /98 (BP Location: Right arm, Patient Position: Sitting, Cuff Size: Adult)   Pulse 59   Temp 97 9 °F (36 6 °C)   Ht 5' 8 7" (1 745 m)   Wt 81 9 kg (180 lb 9 6 oz)   SpO2 99%   BMI 26 90 kg/m²   Recheck : 140/100    CURRENT MEDICATIONS    Current Outpatient Medications:     albuterol (Ventolin HFA) 90 mcg/act inhaler, Inhale 2 puffs as needed for wheezing, Disp: 1 Inhaler, Rfl: 0    aspirin-acetaminophen-caffeine (EXCEDRIN MIGRAINE) 250-250-65 MG per tablet, Take 3 tablets by mouth daily, Disp: , Rfl:     diphenhydrAMINE (BENADRYL) 25 mg capsule, Take 1 tablet by mouth daily , Disp: , Rfl:     gabapentin (NEURONTIN) 100 mg capsule, Take 1 capsule (100 mg total) by mouth daily at bedtime, Disp: 90 capsule, Rfl: 0    gabapentin (NEURONTIN) 600 MG tablet, Take 1 tablet (600 mg total) by mouth 3 (three) times a day, Disp: 90 tablet, Rfl: 0    [START ON 2/3/2021] traMADol (ULTRAM) 50 mg tablet, Take 2 tablets (100 mg total) by mouth every 6 (six) hours as needed for moderate pain, Disp: 240 tablet, Rfl: 0    fluticasone (FLONASE) 50 mcg/act nasal spray, 1 spray into each nostril daily, Disp: 1 Bottle, Rfl: 2      PHYSICAL EXAMINATION   Physical Exam  Vitals signs and nursing note reviewed  Constitutional:       General: He is not in acute distress  Appearance: Normal appearance  He is well-developed and well-groomed  He is not ill-appearing  HENT:      Head: Normocephalic and atraumatic  Right Ear: Tympanic membrane, ear canal and external ear normal       Left Ear: Tympanic membrane, ear canal and external ear normal       Nose: Nose normal    Eyes:      Conjunctiva/sclera: Conjunctivae normal       Pupils: Pupils are equal, round, and reactive to light  Neck:      Vascular: No carotid bruit  Cardiovascular:      Rate and Rhythm: Normal rate and regular rhythm  Heart sounds: Normal heart sounds  Pulmonary:      Effort: Pulmonary effort is normal  No respiratory distress  Breath sounds: Normal breath sounds and air entry  Musculoskeletal:      Right lower leg: No edema  Left lower leg: No edema  Lymphadenopathy:      Cervical: No cervical adenopathy  Skin:     General: Skin is warm and dry  Neurological:      Mental Status: He is alert and oriented to person, place, and time     Psychiatric:         Attention and Perception: Attention normal          Mood and Affect: Mood normal          Speech: Speech normal          Behavior: Behavior normal          Judgment: Judgment normal

## 2021-02-10 ENCOUNTER — CLINICAL SUPPORT (OUTPATIENT)
Dept: FAMILY MEDICINE CLINIC | Facility: CLINIC | Age: 36
End: 2021-02-10

## 2021-02-10 ENCOUNTER — LAB (OUTPATIENT)
Dept: LAB | Facility: CLINIC | Age: 36
End: 2021-02-10
Payer: COMMERCIAL

## 2021-02-10 ENCOUNTER — TELEPHONE (OUTPATIENT)
Dept: FAMILY MEDICINE CLINIC | Facility: CLINIC | Age: 36
End: 2021-02-10

## 2021-02-10 VITALS — SYSTOLIC BLOOD PRESSURE: 122 MMHG | DIASTOLIC BLOOD PRESSURE: 84 MMHG

## 2021-02-10 DIAGNOSIS — Q79.60 EHLERS-DANLOS SYNDROME: ICD-10-CM

## 2021-02-10 DIAGNOSIS — R79.89 ELEVATED TSH: ICD-10-CM

## 2021-02-10 DIAGNOSIS — Z13.1 SCREENING FOR DIABETES MELLITUS: ICD-10-CM

## 2021-02-10 DIAGNOSIS — R03.0 ELEVATED BLOOD PRESSURE READING: Primary | ICD-10-CM

## 2021-02-10 DIAGNOSIS — B34.9 VIRAL ILLNESS: Primary | ICD-10-CM

## 2021-02-10 DIAGNOSIS — M79.7 FIBROMYALGIA: ICD-10-CM

## 2021-02-10 LAB
ALBUMIN SERPL BCP-MCNC: 4.1 G/DL (ref 3.5–5)
ALP SERPL-CCNC: 71 U/L (ref 46–116)
ALT SERPL W P-5'-P-CCNC: 42 U/L (ref 12–78)
ANION GAP SERPL CALCULATED.3IONS-SCNC: 5 MMOL/L (ref 4–13)
AST SERPL W P-5'-P-CCNC: 30 U/L (ref 5–45)
BILIRUB SERPL-MCNC: 0.21 MG/DL (ref 0.2–1)
BUN SERPL-MCNC: 21 MG/DL (ref 5–25)
CALCIUM SERPL-MCNC: 9.3 MG/DL (ref 8.3–10.1)
CHLORIDE SERPL-SCNC: 104 MMOL/L (ref 100–108)
CO2 SERPL-SCNC: 29 MMOL/L (ref 21–32)
CREAT SERPL-MCNC: 1.11 MG/DL (ref 0.6–1.3)
GFR SERPL CREATININE-BSD FRML MDRD: 87 ML/MIN/1.73SQ M
GLUCOSE P FAST SERPL-MCNC: 86 MG/DL (ref 65–99)
POTASSIUM SERPL-SCNC: 3.9 MMOL/L (ref 3.5–5.3)
PROT SERPL-MCNC: 7.1 G/DL (ref 6.4–8.2)
SODIUM SERPL-SCNC: 138 MMOL/L (ref 136–145)
TSH SERPL DL<=0.05 MIU/L-ACNC: 3.16 UIU/ML (ref 0.36–3.74)

## 2021-02-10 PROCEDURE — 80053 COMPREHEN METABOLIC PANEL: CPT

## 2021-02-10 PROCEDURE — 84443 ASSAY THYROID STIM HORMONE: CPT

## 2021-02-10 PROCEDURE — NC001 PR NO CHARGE: Performed by: NURSE PRACTITIONER

## 2021-02-10 PROCEDURE — 36415 COLL VENOUS BLD VENIPUNCTURE: CPT

## 2021-02-10 NOTE — TELEPHONE ENCOUNTER
Agree he get tested for Covid  Order placed  He should follow CDC guidelines while awaiting test results: self quarantine

## 2021-02-10 NOTE — TELEPHONE ENCOUNTER
LMOM advising patient to have COVID test done at [address] [place] [address] and to self quarantine until results come back

## 2021-02-11 DIAGNOSIS — B34.9 VIRAL ILLNESS: ICD-10-CM

## 2021-02-11 PROCEDURE — U0003 INFECTIOUS AGENT DETECTION BY NUCLEIC ACID (DNA OR RNA); SEVERE ACUTE RESPIRATORY SYNDROME CORONAVIRUS 2 (SARS-COV-2) (CORONAVIRUS DISEASE [COVID-19]), AMPLIFIED PROBE TECHNIQUE, MAKING USE OF HIGH THROUGHPUT TECHNOLOGIES AS DESCRIBED BY CMS-2020-01-R: HCPCS | Performed by: NURSE PRACTITIONER

## 2021-02-11 PROCEDURE — U0005 INFEC AGEN DETEC AMPLI PROBE: HCPCS | Performed by: NURSE PRACTITIONER

## 2021-02-12 LAB — SARS-COV-2 RNA RESP QL NAA+PROBE: NEGATIVE

## 2021-02-15 ENCOUNTER — TELEPHONE (OUTPATIENT)
Dept: FAMILY MEDICINE CLINIC | Facility: CLINIC | Age: 36
End: 2021-02-15

## 2021-02-16 ENCOUNTER — TELEPHONE (OUTPATIENT)
Dept: FAMILY MEDICINE CLINIC | Facility: CLINIC | Age: 36
End: 2021-02-16

## 2021-02-16 NOTE — TELEPHONE ENCOUNTER
Pt called and is requesting a return to work note  Came in for a BP check on 2/10 and was recommended to get a covid test  Tested negative for covid  Please advise

## 2021-03-04 DIAGNOSIS — M79.7 FIBROMYALGIA: ICD-10-CM

## 2021-03-04 RX ORDER — TRAMADOL HYDROCHLORIDE 50 MG/1
100 TABLET ORAL EVERY 6 HOURS PRN
Qty: 240 TABLET | Refills: 0 | Status: SHIPPED | OUTPATIENT
Start: 2021-03-04 | End: 2021-04-02 | Stop reason: SDUPTHER

## 2021-03-04 NOTE — TELEPHONE ENCOUNTER
Pt called, requesting refill on tramadol sent to [facility] in [facility] on [address]

## 2021-03-17 DIAGNOSIS — M79.7 FIBROMYALGIA: ICD-10-CM

## 2021-03-17 RX ORDER — GABAPENTIN 600 MG/1
600 TABLET ORAL 3 TIMES DAILY
Qty: 90 TABLET | Refills: 0 | Status: SHIPPED | OUTPATIENT
Start: 2021-03-17 | End: 2021-04-14

## 2021-04-02 DIAGNOSIS — M79.7 FIBROMYALGIA: ICD-10-CM

## 2021-04-03 DIAGNOSIS — M79.7 FIBROMYALGIA: ICD-10-CM

## 2021-04-03 RX ORDER — TRAMADOL HYDROCHLORIDE 50 MG/1
100 TABLET ORAL EVERY 6 HOURS PRN
Qty: 240 TABLET | Refills: 0 | Status: SHIPPED | OUTPATIENT
Start: 2021-04-03 | End: 2021-04-30 | Stop reason: SDUPTHER

## 2021-04-05 RX ORDER — TRAMADOL HYDROCHLORIDE 50 MG/1
100 TABLET ORAL EVERY 6 HOURS PRN
Qty: 240 TABLET | Refills: 0 | OUTPATIENT
Start: 2021-04-05

## 2021-04-14 DIAGNOSIS — M79.7 FIBROMYALGIA: ICD-10-CM

## 2021-04-14 RX ORDER — GABAPENTIN 600 MG/1
TABLET ORAL
Qty: 90 TABLET | Refills: 0 | Status: SHIPPED | OUTPATIENT
Start: 2021-04-14 | End: 2021-05-14 | Stop reason: SDUPTHER

## 2021-04-16 ENCOUNTER — TELEPHONE (OUTPATIENT)
Dept: FAMILY MEDICINE CLINIC | Facility: CLINIC | Age: 36
End: 2021-04-16

## 2021-04-16 NOTE — TELEPHONE ENCOUNTER
Pt called asking for gabapentin was sent to pharmacy  Script was sent to [facility] on 4/14/2021  I called pt to let him know but VM is full  Try back later

## 2021-04-30 DIAGNOSIS — M79.7 FIBROMYALGIA: ICD-10-CM

## 2021-04-30 RX ORDER — TRAMADOL HYDROCHLORIDE 50 MG/1
100 TABLET ORAL EVERY 6 HOURS PRN
Qty: 240 TABLET | Refills: 0 | Status: SHIPPED | OUTPATIENT
Start: 2021-04-30 | End: 2021-05-27 | Stop reason: SDUPTHER

## 2021-05-14 DIAGNOSIS — M79.7 FIBROMYALGIA: ICD-10-CM

## 2021-05-14 RX ORDER — GABAPENTIN 600 MG/1
600 TABLET ORAL 3 TIMES DAILY
Qty: 90 TABLET | Refills: 0 | Status: SHIPPED | OUTPATIENT
Start: 2021-05-14 | End: 2021-06-16 | Stop reason: SDUPTHER

## 2021-05-14 RX ORDER — GABAPENTIN 100 MG/1
100 CAPSULE ORAL
Qty: 90 CAPSULE | Refills: 0 | Status: SHIPPED | OUTPATIENT
Start: 2021-05-14 | End: 2021-08-18 | Stop reason: SDUPTHER

## 2021-05-27 DIAGNOSIS — M79.7 FIBROMYALGIA: ICD-10-CM

## 2021-05-27 NOTE — TELEPHONE ENCOUNTER
PDMP checked, last filled 5/1/2021    Last appt: 1/27/2021  Next appt: none scheduled, states he's going to call to schedule

## 2021-05-28 RX ORDER — TRAMADOL HYDROCHLORIDE 50 MG/1
100 TABLET ORAL EVERY 6 HOURS PRN
Qty: 240 TABLET | Refills: 0 | Status: SHIPPED | OUTPATIENT
Start: 2021-05-29 | End: 2021-06-29 | Stop reason: SDUPTHER

## 2021-05-28 NOTE — TELEPHONE ENCOUNTER
Birgit - I authorized refill with tomorrow's date, but wasn't sure when he was supposed to be coming back to the office  He saw you last in January

## 2021-06-16 DIAGNOSIS — M79.7 FIBROMYALGIA: ICD-10-CM

## 2021-06-16 RX ORDER — GABAPENTIN 600 MG/1
600 TABLET ORAL 3 TIMES DAILY
Qty: 90 TABLET | Refills: 0 | Status: SHIPPED | OUTPATIENT
Start: 2021-06-16 | End: 2021-07-15 | Stop reason: SDUPTHER

## 2021-06-16 NOTE — TELEPHONE ENCOUNTER
Last OV 1/27/21    No future appts      Patient called back and requested prescription to go to [facility] on [address] in [place]

## 2021-06-29 DIAGNOSIS — M79.7 FIBROMYALGIA: ICD-10-CM

## 2021-06-29 RX ORDER — TRAMADOL HYDROCHLORIDE 50 MG/1
100 TABLET ORAL EVERY 6 HOURS PRN
Qty: 240 TABLET | Refills: 0 | Status: SHIPPED | OUTPATIENT
Start: 2021-06-29 | End: 2021-07-29 | Stop reason: SDUPTHER

## 2021-07-15 DIAGNOSIS — J30.2 SEASONAL ALLERGIES: ICD-10-CM

## 2021-07-15 DIAGNOSIS — M79.7 FIBROMYALGIA: ICD-10-CM

## 2021-07-17 RX ORDER — FLUTICASONE PROPIONATE 50 MCG
1 SPRAY, SUSPENSION (ML) NASAL DAILY
Qty: 16 G | Refills: 1 | Status: SHIPPED | OUTPATIENT
Start: 2021-07-17 | End: 2021-10-12 | Stop reason: SDUPTHER

## 2021-07-17 RX ORDER — GABAPENTIN 600 MG/1
600 TABLET ORAL 3 TIMES DAILY
Qty: 90 TABLET | Refills: 0 | Status: SHIPPED | OUTPATIENT
Start: 2021-07-17 | End: 2021-08-18 | Stop reason: SDUPTHER

## 2021-07-17 NOTE — TELEPHONE ENCOUNTER
Please call patient - he saw Tray Lombardi in January - he is due for a regular check up before his next refill

## 2021-07-29 DIAGNOSIS — M79.7 FIBROMYALGIA: ICD-10-CM

## 2021-07-30 RX ORDER — TRAMADOL HYDROCHLORIDE 50 MG/1
100 TABLET ORAL EVERY 6 HOURS PRN
Qty: 240 TABLET | Refills: 0 | Status: SHIPPED | OUTPATIENT
Start: 2021-07-30 | End: 2021-08-27 | Stop reason: SDUPTHER

## 2021-07-30 NOTE — TELEPHONE ENCOUNTER
Last ov 1/27/21  FYI had recent ED visit 7/17 via contusion  PDM checked Tramadol last filled 6/29/21 Q-240 via 30d supply R-0

## 2021-07-30 NOTE — TELEPHONE ENCOUNTER
Call patient  I am not sure he was contacted with previous refill, I had written that it has been 6 months since he saw Marques Riding in the office he needs an appointment before his next refill

## 2021-08-04 NOTE — TELEPHONE ENCOUNTER
Left detailed message on patients voicemail stating that he needs to schedule his 6 month f u before anymore medication can be refilled for him

## 2021-08-18 ENCOUNTER — OFFICE VISIT (OUTPATIENT)
Dept: FAMILY MEDICINE CLINIC | Facility: CLINIC | Age: 36
End: 2021-08-18
Payer: COMMERCIAL

## 2021-08-18 VITALS
WEIGHT: 183 LBS | OXYGEN SATURATION: 99 % | DIASTOLIC BLOOD PRESSURE: 80 MMHG | SYSTOLIC BLOOD PRESSURE: 136 MMHG | HEIGHT: 69 IN | HEART RATE: 83 BPM | TEMPERATURE: 97.3 F | BODY MASS INDEX: 27.11 KG/M2

## 2021-08-18 DIAGNOSIS — Z72.0 TOBACCO USE: ICD-10-CM

## 2021-08-18 DIAGNOSIS — Z23 NEED FOR TDAP VACCINATION: ICD-10-CM

## 2021-08-18 DIAGNOSIS — Q79.60 EHLERS-DANLOS SYNDROME: ICD-10-CM

## 2021-08-18 DIAGNOSIS — Z13.1 SCREENING FOR DIABETES MELLITUS: ICD-10-CM

## 2021-08-18 DIAGNOSIS — J45.20 MILD INTERMITTENT ASTHMA WITHOUT COMPLICATION: ICD-10-CM

## 2021-08-18 DIAGNOSIS — M79.7 FIBROMYALGIA: Primary | ICD-10-CM

## 2021-08-18 DIAGNOSIS — Z13.220 SCREENING FOR LIPID DISORDERS: ICD-10-CM

## 2021-08-18 DIAGNOSIS — Z13.29 SCREENING FOR THYROID DISORDER: ICD-10-CM

## 2021-08-18 PROCEDURE — 99214 OFFICE O/P EST MOD 30 MIN: CPT | Performed by: NURSE PRACTITIONER

## 2021-08-18 PROCEDURE — 90715 TDAP VACCINE 7 YRS/> IM: CPT | Performed by: NURSE PRACTITIONER

## 2021-08-18 PROCEDURE — 90471 IMMUNIZATION ADMIN: CPT | Performed by: NURSE PRACTITIONER

## 2021-08-18 PROCEDURE — 3008F BODY MASS INDEX DOCD: CPT | Performed by: NURSE PRACTITIONER

## 2021-08-18 RX ORDER — GABAPENTIN 100 MG/1
100 CAPSULE ORAL
Qty: 90 CAPSULE | Refills: 1 | Status: SHIPPED | OUTPATIENT
Start: 2021-08-18 | End: 2022-02-09 | Stop reason: SDUPTHER

## 2021-08-18 RX ORDER — GABAPENTIN 600 MG/1
600 TABLET ORAL 3 TIMES DAILY
Qty: 90 TABLET | Refills: 5 | Status: SHIPPED | OUTPATIENT
Start: 2021-08-18 | End: 2022-02-09 | Stop reason: SDUPTHER

## 2021-08-18 NOTE — PROGRESS NOTES
[facility]    ASSESSMENT AND PLAN     1  Fibromyalgia  Doing well on current treatment  Currently taking ultram as follows: 150mg in AM-150mg at lunch-100mg at HS  PMED verified with no red flags  Patient requesting refill today, so he does not have to call when it is due  Refill sent today with date 2/3,  Note pharmacy: No early refill  Continues with gabapentin as follows:  600 mg in a m  600 mg in afternoon -700 mg at HS  He feels well managed on his current treatment plan  No changes today  Follow-up 6 months  Sooner with problems or concerns  Pain agreement updated today  Pharmacy changed    - gabapentin (NEURONTIN) 600 MG tablet; Take 1 tablet (600 mg total) by mouth 3 (three) times a day  Dispense: 90 tablet; Refill: 5  - gabapentin (NEURONTIN) 100 mg capsule; Take 1 capsule (100 mg total) by mouth daily at bedtime  Dispense: 90 capsule; Refill: 1    2  Starr-Danlos syndrome  (#1)    3  Mild intermittent asthma without complication   stable  Infrequent use of rescue inhaler  Averaging 1-2 times per month at most    4  Tobacco use   continues to smoke 3/4 to 1 ppd  Cessation encouraged  Patient admits he is not ready at this time  Will continue to revisit with each subsequent appointment    5  Screening for diabetes mellitus  Due for lab work before next check  Schedule annual physical Feb 2022  Complete labs prior    - Comprehensive metabolic panel; Future    6  Screening for lipid disorders    - Lipid panel; Future    7  Screening for thyroid disorder    - TSH, 3rd generation with Free T4 reflex; Future    8  Need for Tdap vaccination   Note patient had injury at work last month  Involving metal in left upper arm  Last Tdap greater than 5 years  Updated today  - TDAP VACCINE GREATER THAN OR EQUAL TO 6YO IM            SUBJECTIVE       Patient ID: Leoncio Capps is a 31 y o  male      Chief Complaint   Patient presents with    Follow-up     medication check       HISTORY OF PRESENT ILLNESS     Patient presents today for routine checkup        The following portions of the patient's history were reviewed and updated as appropriate: allergies, current medications, past family history, past medical history, past social history, past surgical history and problem list     REVIEW OF SYSTEMS  Review of Systems   Constitutional: Negative  HENT: Negative  Respiratory: Negative  Cardiovascular: Negative  Gastrointestinal: Negative  Genitourinary: Negative  Musculoskeletal:        Injury to left upper arm  Work related  Was evaluated through work   Neurological: Negative  Psychiatric/Behavioral: Negative  OBJECTIVE      VITAL SIGNS  /80 (BP Location: Right arm, Patient Position: Sitting, Cuff Size: Large)   Pulse 83   Temp (!) 97 3 °F (36 3 °C) (Tympanic)   Ht 5' 8 7" (1 745 m)   Wt 83 kg (183 lb)   SpO2 99%   BMI 27 26 kg/m²     CURRENT MEDICATIONS    Current Outpatient Medications:     albuterol (Ventolin HFA) 90 mcg/act inhaler, Inhale 2 puffs as needed for wheezing, Disp: 1 Inhaler, Rfl: 0    aspirin-acetaminophen-caffeine (EXCEDRIN MIGRAINE) 250-250-65 MG per tablet, Take 3 tablets by mouth daily, Disp: , Rfl:     diphenhydrAMINE (BENADRYL) 25 mg capsule, Take 1 tablet by mouth daily , Disp: , Rfl:     fluticasone (FLONASE) 50 mcg/act nasal spray, 1 spray into each nostril daily, Disp: 16 g, Rfl: 1    gabapentin (NEURONTIN) 100 mg capsule, Take 1 capsule (100 mg total) by mouth daily at bedtime, Disp: 90 capsule, Rfl: 1    gabapentin (NEURONTIN) 600 MG tablet, Take 1 tablet (600 mg total) by mouth 3 (three) times a day, Disp: 90 tablet, Rfl: 5    traMADol (ULTRAM) 50 mg tablet, Take 2 tablets (100 mg total) by mouth every 6 (six) hours as needed for moderate pain, Disp: 240 tablet, Rfl: 0      PHYSICAL EXAMINATION   Physical Exam  Vitals and nursing note reviewed  Constitutional:       General: He is not in acute distress       Appearance: Normal appearance  He is well-developed  He is not ill-appearing  HENT:      Head: Normocephalic and atraumatic  Right Ear: Tympanic membrane, ear canal and external ear normal       Left Ear: Tympanic membrane, ear canal and external ear normal       Nose: Nose normal       Mouth/Throat:      Mouth: Mucous membranes are moist       Pharynx: Oropharynx is clear  Eyes:      Conjunctiva/sclera: Conjunctivae normal       Pupils: Pupils are equal, round, and reactive to light  Neck:      Vascular: No carotid bruit  Cardiovascular:      Rate and Rhythm: Normal rate and regular rhythm  Heart sounds: Normal heart sounds  Pulmonary:      Effort: Pulmonary effort is normal  No respiratory distress  Breath sounds: Normal breath sounds and air entry  Musculoskeletal:      Right lower leg: No edema  Left lower leg: No edema  Lymphadenopathy:      Cervical: No cervical adenopathy  Skin:     General: Skin is warm and dry  Neurological:      General: No focal deficit present  Mental Status: He is alert and oriented to person, place, and time  Psychiatric:         Attention and Perception: Attention normal          Mood and Affect: Mood and affect normal          Speech: Speech normal          Behavior: Behavior normal          Thought Content:  Thought content normal

## 2021-08-27 DIAGNOSIS — M79.7 FIBROMYALGIA: ICD-10-CM

## 2021-08-27 RX ORDER — TRAMADOL HYDROCHLORIDE 50 MG/1
100 TABLET ORAL EVERY 6 HOURS PRN
Qty: 240 TABLET | Refills: 0 | Status: SHIPPED | OUTPATIENT
Start: 2021-08-28 | End: 2021-09-27 | Stop reason: SDUPTHER

## 2021-09-27 DIAGNOSIS — M79.7 FIBROMYALGIA: ICD-10-CM

## 2021-09-27 RX ORDER — TRAMADOL HYDROCHLORIDE 50 MG/1
100 TABLET ORAL EVERY 6 HOURS PRN
Qty: 240 TABLET | Refills: 0 | Status: SHIPPED | OUTPATIENT
Start: 2021-09-27 | End: 2021-10-28 | Stop reason: SDUPTHER

## 2021-09-29 ENCOUNTER — TELEMEDICINE (OUTPATIENT)
Dept: FAMILY MEDICINE CLINIC | Facility: CLINIC | Age: 36
End: 2021-09-29
Payer: COMMERCIAL

## 2021-09-29 DIAGNOSIS — J20.9 ACUTE BRONCHITIS, UNSPECIFIED ORGANISM: Primary | ICD-10-CM

## 2021-09-29 PROCEDURE — 99214 OFFICE O/P EST MOD 30 MIN: CPT | Performed by: FAMILY MEDICINE

## 2021-09-29 PROCEDURE — 4004F PT TOBACCO SCREEN RCVD TLK: CPT | Performed by: FAMILY MEDICINE

## 2021-09-29 RX ORDER — DOXYCYCLINE 100 MG/1
100 TABLET ORAL 2 TIMES DAILY
Qty: 14 TABLET | Refills: 0 | Status: SHIPPED | OUTPATIENT
Start: 2021-09-29 | End: 2021-10-06

## 2021-09-29 NOTE — PROGRESS NOTES
Virtual Regular Visit    Verification of patient location:    Patient is located in the following state in which I hold an active license [place]      Assessment/Plan:  1  Acute bronchitis, unspecified organism   symptoms appear persisting  At this time, patient's symptoms appear likely secondary to acute bronchitis  He did have COVID testing at [facility] which she just received his results yesterday  His testing was negative  Will request these records  At this time, continue supportive care  Maintain hydration  Take over-the-counter Mucinex  He may continue with his albuterol inhaler  Will start treatment empirically with doxycycline 100 mg b i d  for the next 7 days  Follow up if any symptoms persist   - doxycycline (ADOXA) 100 MG tablet; Take 1 tablet (100 mg total) by mouth 2 (two) times a day for 7 days  Dispense: 14 tablet; Refill: 0      I have spent 8 minutes with Patient  today in which greater than 50% of this time was spent in counseling/coordination of care regarding Diagnostic results, Prognosis, Risks and benefits of tx options, Intructions for management, Patient and family education and Importance of tx compliance  Problem List Items Addressed This Visit     None      Visit Diagnoses     Acute bronchitis, unspecified organism    -  Primary    Relevant Medications    doxycycline (ADOXA) 100 MG tablet               Reason for visit is   Chief Complaint   Patient presents with    Virtual Regular Visit        Encounter provider Geri Painter DO    Provider located at [place] [address]  [address] [place] [address]  [phone]      Recent Visits  No visits were found meeting these conditions    Showing recent visits within past 7 days and meeting all other requirements  Today's Visits  Date Type Provider Dept   09/29/21 Telemedicine DO [facility]   Showing today's visits and meeting all other requirements  Future Appointments  No visits were found meeting these conditions  Showing future appointments within next 150 days and meeting all other requirements       The patient was identified by name and date of birth  Arlen Wilcox was informed that this is a telemedicine visit and that the visit is being conducted through [address] [place] [address] [facility] and patient was informed that this is a secure, HIPAA-compliant platform  He agrees to proceed     My office door was closed  No one else was in the room  He acknowledged consent and understanding of privacy and security of the video platform  The patient has agreed to participate and understands they can discontinue the visit at any time  Patient is aware this is a billable service  Subjective  Arlen Wilcox is a 31 y o  male  Presents today for his virtual visit   Cough  This is a new problem  Episode onset: 9/19/21  The problem has been unchanged  The problem occurs constantly  The cough is productive of sputum  Associated symptoms include nasal congestion, postnasal drip, rhinorrhea, shortness of breath and wheezing  Pertinent negatives include no chest pain, chills, ear congestion, ear pain, eye redness, fever, headaches, myalgias or sore throat  He has tried a beta-agonist inhaler for the symptoms  The treatment provided mild relief  His past medical history is significant for asthma  There is no history of environmental allergies  Had covid testing at [facility] on 9/24 and was neg        Past Medical History:   Diagnosis Date    Fatigue     last assessed: 7/1/13       Past Surgical History:   Procedure Laterality Date    MANDIBLE SURGERY         Current Outpatient Medications   Medication Sig Dispense Refill    albuterol (Ventolin HFA) 90 mcg/act inhaler Inhale 2 puffs as needed for wheezing 1 Inhaler 0    aspirin-acetaminophen-caffeine (EXCEDRIN MIGRAINE) 250-250-65 MG per tablet Take 3 tablets by mouth daily      diphenhydrAMINE (BENADRYL) 25 mg capsule Take 1 tablet by mouth daily       doxycycline (ADOXA) 100 MG tablet Take 1 tablet (100 mg total) by mouth 2 (two) times a day for 7 days 14 tablet 0    fluticasone (FLONASE) 50 mcg/act nasal spray 1 spray into each nostril daily 16 g 1    gabapentin (NEURONTIN) 100 mg capsule Take 1 capsule (100 mg total) by mouth daily at bedtime 90 capsule 1    gabapentin (NEURONTIN) 600 MG tablet Take 1 tablet (600 mg total) by mouth 3 (three) times a day 90 tablet 5    traMADol (ULTRAM) 50 mg tablet Take 2 tablets (100 mg total) by mouth every 6 (six) hours as needed for moderate pain 240 tablet 0     No current facility-administered medications for this visit  Allergies   Allergen Reactions    Clindamycin Itching, Rash and Hives     Other reaction(s): Other (See Comments)  rash/itching  rash/itching    Erythromycin Itching and Rash       Review of Systems   Constitutional: Negative for activity change, chills, fatigue and fever  HENT: Positive for postnasal drip and rhinorrhea  Negative for congestion, ear pain, sinus pressure and sore throat  Eyes: Negative for redness, itching and visual disturbance  Respiratory: Positive for cough, shortness of breath and wheezing  Cardiovascular: Negative for chest pain and palpitations  Gastrointestinal: Negative for abdominal pain, diarrhea and nausea  Endocrine: Negative for cold intolerance and heat intolerance  Genitourinary: Negative for dysuria, flank pain and frequency  Musculoskeletal: Negative for arthralgias, back pain, gait problem and myalgias  Skin: Negative for color change  Allergic/Immunologic: Negative for environmental allergies  Neurological: Negative for dizziness, numbness and headaches  Psychiatric/Behavioral: Negative for behavioral problems and sleep disturbance  Video Exam    There were no vitals filed for this visit  Physical Exam  Constitutional:       General: He is not in acute distress       Appearance: He is well-developed  He is not diaphoretic  HENT:      Head: Normocephalic and atraumatic  Not macrocephalic and not microcephalic  Right Ear: Hearing normal       Left Ear: Hearing normal       Nose: Nose normal    Eyes:      General: Lids are normal       Conjunctiva/sclera: Conjunctivae normal    Pulmonary:      Effort: Pulmonary effort is normal  No respiratory distress  Musculoskeletal:      Cervical back: Full passive range of motion without pain  Skin:     General: Skin is dry  Neurological:      Mental Status: He is alert  He is not disoriented  GCS: GCS eye subscore is 4  GCS verbal subscore is 5  GCS motor subscore is 6  Cranial Nerves: No cranial nerve deficit  Psychiatric:         Speech: Speech normal          Behavior: Behavior normal          Thought Content: Thought content normal          Judgment: Judgment normal               VIRTUAL VISIT DISCLAIMER      Megan Otoole verbally agrees to participate in [employer]  Pt is aware that [employer] could be limited without vital signs or the ability to perform a full hands-on physical exam  Parviz Cox understands he or the provider may request at any time to terminate the video visit and request the patient to seek care or treatment in person

## 2021-10-12 DIAGNOSIS — J30.2 SEASONAL ALLERGIES: ICD-10-CM

## 2021-10-12 RX ORDER — FLUTICASONE PROPIONATE 50 MCG
1 SPRAY, SUSPENSION (ML) NASAL DAILY
Qty: 16 G | Refills: 1 | Status: SHIPPED | OUTPATIENT
Start: 2021-10-12 | End: 2022-01-17 | Stop reason: SDUPTHER

## 2021-10-28 DIAGNOSIS — M79.7 FIBROMYALGIA: ICD-10-CM

## 2021-10-28 NOTE — TELEPHONE ENCOUNTER
Patient states today during nurse visit that he has been having nausea and sinus issues since Sunday  He was exposed to someone Saturday night who was also sick  He states that he vomited twice on Monday and didn't go to work  He is still having nausea but no vomiting  He is asking what he should do  I recommended he get tested for COVID  Please advise  Bilobed Transposition Flap Text: The defect edges were debeveled with a #15 scalpel blade.  Given the location of the defect and the proximity to free margins a bilobed transposition flap was deemed most appropriate.  Using a sterile surgical marker, an appropriate bilobe flap drawn around the defect.    The area thus outlined was incised deep to adipose tissue with a #15 scalpel blade.  The skin margins were undermined to an appropriate distance in all directions utilizing iris scissors.

## 2021-10-29 RX ORDER — TRAMADOL HYDROCHLORIDE 50 MG/1
100 TABLET ORAL EVERY 6 HOURS PRN
Qty: 240 TABLET | Refills: 0 | Status: SHIPPED | OUTPATIENT
Start: 2021-10-29 | End: 2021-11-01 | Stop reason: SDUPTHER

## 2021-11-01 DIAGNOSIS — M79.7 FIBROMYALGIA: ICD-10-CM

## 2021-11-02 RX ORDER — TRAMADOL HYDROCHLORIDE 50 MG/1
100 TABLET ORAL EVERY 6 HOURS PRN
Qty: 240 TABLET | Refills: 0 | Status: SHIPPED | OUTPATIENT
Start: 2021-11-02 | End: 2021-11-24 | Stop reason: SDUPTHER

## 2021-11-24 DIAGNOSIS — M79.7 FIBROMYALGIA: ICD-10-CM

## 2021-11-29 RX ORDER — TRAMADOL HYDROCHLORIDE 50 MG/1
100 TABLET ORAL EVERY 6 HOURS PRN
Qty: 240 TABLET | Refills: 0 | Status: SHIPPED | OUTPATIENT
Start: 2021-11-29 | End: 2021-12-28 | Stop reason: SDUPTHER

## 2021-12-28 DIAGNOSIS — M79.7 FIBROMYALGIA: ICD-10-CM

## 2021-12-28 RX ORDER — TRAMADOL HYDROCHLORIDE 50 MG/1
100 TABLET ORAL EVERY 6 HOURS PRN
Qty: 240 TABLET | Refills: 0 | Status: SHIPPED | OUTPATIENT
Start: 2021-12-28 | End: 2022-01-26 | Stop reason: SDUPTHER

## 2022-01-17 DIAGNOSIS — J30.2 SEASONAL ALLERGIES: ICD-10-CM

## 2022-01-17 RX ORDER — FLUTICASONE PROPIONATE 50 MCG
1 SPRAY, SUSPENSION (ML) NASAL DAILY
Qty: 16 G | Refills: 1 | Status: SHIPPED | OUTPATIENT
Start: 2022-01-17 | End: 2022-04-21 | Stop reason: SDUPTHER

## 2022-01-26 DIAGNOSIS — M79.7 FIBROMYALGIA: ICD-10-CM

## 2022-01-28 RX ORDER — TRAMADOL HYDROCHLORIDE 50 MG/1
100 TABLET ORAL EVERY 6 HOURS PRN
Qty: 240 TABLET | Refills: 0 | Status: SHIPPED | OUTPATIENT
Start: 2022-01-28 | End: 2022-02-28 | Stop reason: SDUPTHER

## 2022-01-28 NOTE — TELEPHONE ENCOUNTER
Call patient  His tramadol prescription was authorized but I noticed that when he saw Whit Montejo in August, she asked him to return in 6 months  I do not see that he has an appointment scheduled for February  He should be following up with her before his next refill

## 2022-02-09 ENCOUNTER — OFFICE VISIT (OUTPATIENT)
Dept: FAMILY MEDICINE CLINIC | Facility: CLINIC | Age: 37
End: 2022-02-09
Payer: COMMERCIAL

## 2022-02-09 VITALS
HEIGHT: 69 IN | SYSTOLIC BLOOD PRESSURE: 126 MMHG | BODY MASS INDEX: 27.34 KG/M2 | TEMPERATURE: 97.5 F | WEIGHT: 184.6 LBS | HEART RATE: 79 BPM | OXYGEN SATURATION: 98 % | DIASTOLIC BLOOD PRESSURE: 86 MMHG

## 2022-02-09 DIAGNOSIS — Z13.1 SCREENING FOR DIABETES MELLITUS: ICD-10-CM

## 2022-02-09 DIAGNOSIS — J45.20 MILD INTERMITTENT ASTHMA WITHOUT COMPLICATION: ICD-10-CM

## 2022-02-09 DIAGNOSIS — Z11.4 SCREENING FOR HIV (HUMAN IMMUNODEFICIENCY VIRUS): ICD-10-CM

## 2022-02-09 DIAGNOSIS — Z13.29 SCREENING FOR THYROID DISORDER: ICD-10-CM

## 2022-02-09 DIAGNOSIS — Z72.0 TOBACCO USE: ICD-10-CM

## 2022-02-09 DIAGNOSIS — M79.7 FIBROMYALGIA: Primary | ICD-10-CM

## 2022-02-09 DIAGNOSIS — Z11.59 NEED FOR HEPATITIS C SCREENING TEST: ICD-10-CM

## 2022-02-09 DIAGNOSIS — Q79.60 EHLERS-DANLOS SYNDROME: ICD-10-CM

## 2022-02-09 PROCEDURE — 99214 OFFICE O/P EST MOD 30 MIN: CPT | Performed by: NURSE PRACTITIONER

## 2022-02-09 PROCEDURE — 3725F SCREEN DEPRESSION PERFORMED: CPT | Performed by: NURSE PRACTITIONER

## 2022-02-09 PROCEDURE — 3008F BODY MASS INDEX DOCD: CPT | Performed by: NURSE PRACTITIONER

## 2022-02-09 PROCEDURE — 4004F PT TOBACCO SCREEN RCVD TLK: CPT | Performed by: NURSE PRACTITIONER

## 2022-02-09 RX ORDER — VARENICLINE TARTRATE 25 MG
KIT ORAL
Qty: 53 TABLET | Refills: 0 | Status: SHIPPED | OUTPATIENT
Start: 2022-02-09

## 2022-02-09 RX ORDER — GABAPENTIN 100 MG/1
100 CAPSULE ORAL
Qty: 30 CAPSULE | Refills: 5 | Status: SHIPPED | OUTPATIENT
Start: 2022-02-09

## 2022-02-09 RX ORDER — GABAPENTIN 600 MG/1
600 TABLET ORAL 3 TIMES DAILY
Qty: 90 TABLET | Refills: 5 | Status: SHIPPED | OUTPATIENT
Start: 2022-02-09

## 2022-02-09 NOTE — PROGRESS NOTES
[facility]    ASSESSMENT AND PLAN     1  Fibromyalgia  Doing well on current treatment  Ultram schedule: 150mg in AM-150mg at lunch-100mg at HS   Gabapentin as follows:  600 mg in a m  600 mg in afternoon -700 mg at HS    Occasional joint pain-primarily wrists, with bad weather  Otherwise he feels well managed on current treatment plan   No changes today  Current pain management/opioid agreement on file (August 2021)  Follow-up 6 months  Annual physical at that time    - gabapentin (NEURONTIN) 600 MG tablet; Take 1 tablet (600 mg total) by mouth 3 (three) times a day  Dispense: 90 tablet; Refill: 5  - gabapentin (NEURONTIN) 100 mg capsule; Take 1 capsule (100 mg total) by mouth daily at bedtime  Dispense: 30 capsule; Refill: 5    2  Starr-Danlos syndrome  (#1)    3  Mild intermittent asthma without complication  Stable  Occasional inhaler use, but primarily only when sick  4  Tobacco use  Continue smoking 1 ppd for the last 2 years  Has successfully quit with Chantix in the past   Requesting Rx today  Smoking cessation efforts encouraged    - varenicline (CHANTIX ELIJAH) 0 5 MG X 11 & 1 MG X 42 tablet; Take one 0 5 mg tablet by mouth once daily for 3 days, then one 0 5 mg tablet by mouth twice daily for 4 days, then one 1 mg tablet by mouth twice daily  Dispense: 53 tablet; Refill: 0    5  Screening for HIV (human immunodeficiency virus)  Age recommended screening    - HIV 1/2 Antigen/Antibody (4th Generation) w Reflex [facility]; Future    6  Need for hepatitis C screening test  Age recommended screenings    - Hepatitis C antibody; Future    7  Screening for thyroid disorder  TSH elevated February 20 20:4 8  Normalized in February 2021  Will repeat today    - TSH, 3rd generation with Free T4 reflex; Future    8  Screening for diabetes mellitus    - Comprehensive metabolic panel; Future            SUBJECTIVE       Patient ID: Leila Wheeler is a 31 y o  male      Chief Complaint   Patient presents with    Medication Management     Med check    Nicotine Dependence     Would like to discuss options to quit smoking- smokes almost 1 ppd       HISTORY OF PRESENT ILLNESS    Presents for 6 month check  Interested in Chantix        The following portions of the patient's history were reviewed and updated as appropriate: allergies, current medications, past family history, past medical history, past social history, past surgical history and problem list     REVIEW OF SYSTEMS  Review of Systems   Constitutional: Negative  HENT: Negative  Respiratory: Negative  Cardiovascular: Negative  Gastrointestinal: Negative  Genitourinary: Negative  Neurological: Negative  Psychiatric/Behavioral: Negative          OBJECTIVE      VITAL SIGNS  /86 (BP Location: Left arm)   Pulse 79   Temp 97 5 °F (36 4 °C)   Ht 5' 9" (1 753 m)   Wt 83 7 kg (184 lb 9 6 oz)   SpO2 98%   BMI 27 26 kg/m²     CURRENT MEDICATIONS    Current Outpatient Medications:     albuterol (Ventolin HFA) 90 mcg/act inhaler, Inhale 2 puffs as needed for wheezing, Disp: 1 Inhaler, Rfl: 0    aspirin-acetaminophen-caffeine (EXCEDRIN MIGRAINE) 250-250-65 MG per tablet, Take 3 tablets by mouth daily, Disp: , Rfl:     diphenhydrAMINE (BENADRYL) 25 mg capsule, Take 1 tablet by mouth daily , Disp: , Rfl:     fluticasone (FLONASE) 50 mcg/act nasal spray, 1 spray into each nostril daily, Disp: 16 g, Rfl: 1    gabapentin (NEURONTIN) 100 mg capsule, Take 1 capsule (100 mg total) by mouth daily at bedtime, Disp: 30 capsule, Rfl: 5    gabapentin (NEURONTIN) 600 MG tablet, Take 1 tablet (600 mg total) by mouth 3 (three) times a day, Disp: 90 tablet, Rfl: 5    traMADol (ULTRAM) 50 mg tablet, Take 2 tablets (100 mg total) by mouth every 6 (six) hours as needed for moderate pain, Disp: 240 tablet, Rfl: 0    varenicline (CHANTIX ELIJAH) 0 5 MG X 11 & 1 MG X 42 tablet, Take one 0 5 mg tablet by mouth once daily for 3 days, then one 0 5 mg tablet by mouth twice daily for 4 days, then one 1 mg tablet by mouth twice daily  , Disp: 53 tablet, Rfl: 0      PHYSICAL EXAMINATION   Physical Exam  Vitals and nursing note reviewed  Constitutional:       General: He is not in acute distress  Appearance: Normal appearance  He is well-developed  He is not ill-appearing  HENT:      Head: Normocephalic and atraumatic  Right Ear: Tympanic membrane, ear canal and external ear normal       Left Ear: Tympanic membrane, ear canal and external ear normal       Nose: Nose normal       Mouth/Throat:      Mouth: Mucous membranes are moist       Pharynx: Oropharynx is clear  Eyes:      Conjunctiva/sclera: Conjunctivae normal       Pupils: Pupils are equal, round, and reactive to light  Neck:      Vascular: No carotid bruit  Cardiovascular:      Rate and Rhythm: Normal rate and regular rhythm  Heart sounds: Normal heart sounds  Pulmonary:      Effort: Pulmonary effort is normal  No respiratory distress  Breath sounds: Normal breath sounds and air entry  Musculoskeletal:      Right lower leg: No edema  Left lower leg: No edema  Skin:     General: Skin is warm and dry  Neurological:      General: No focal deficit present  Mental Status: He is alert and oriented to person, place, and time     Psychiatric:         Attention and Perception: Attention normal          Mood and Affect: Mood normal          Speech: Speech normal          Behavior: Behavior normal

## 2022-02-28 DIAGNOSIS — M79.7 FIBROMYALGIA: ICD-10-CM

## 2022-03-01 RX ORDER — TRAMADOL HYDROCHLORIDE 50 MG/1
100 TABLET ORAL EVERY 6 HOURS PRN
Qty: 240 TABLET | Refills: 0 | Status: SHIPPED | OUTPATIENT
Start: 2022-03-01 | End: 2022-03-28 | Stop reason: SDUPTHER

## 2022-03-25 DIAGNOSIS — J45.909 UNCOMPLICATED ASTHMA, UNSPECIFIED ASTHMA SEVERITY, UNSPECIFIED WHETHER PERSISTENT: ICD-10-CM

## 2022-03-25 DIAGNOSIS — M79.7 FIBROMYALGIA: ICD-10-CM

## 2022-03-28 RX ORDER — TRAMADOL HYDROCHLORIDE 50 MG/1
100 TABLET ORAL EVERY 6 HOURS PRN
Qty: 240 TABLET | Refills: 0 | OUTPATIENT
Start: 2022-03-28

## 2022-03-28 RX ORDER — ALBUTEROL SULFATE 90 UG/1
2 AEROSOL, METERED RESPIRATORY (INHALATION) AS NEEDED
Qty: 18 G | Refills: 1 | Status: SHIPPED | OUTPATIENT
Start: 2022-03-28

## 2022-04-21 DIAGNOSIS — J30.2 SEASONAL ALLERGIES: ICD-10-CM

## 2022-04-21 DIAGNOSIS — M79.7 FIBROMYALGIA: ICD-10-CM

## 2022-04-21 NOTE — TELEPHONE ENCOUNTER
Phone call from patient requesting refills on Tramadol and Flonase  Also requesting generic for Chantix  Advised him it was sent by Eryn Love in Feb, asked him to contact pharmacy  Unsure if still recalled  Last OV 2/9/22    Per PDMP Tramadol 50 mg last filled 3/29/22 #240    No future appts

## 2022-04-22 RX ORDER — FLUTICASONE PROPIONATE 50 MCG
1 SPRAY, SUSPENSION (ML) NASAL DAILY
Qty: 16 G | Refills: 1 | Status: SHIPPED | OUTPATIENT
Start: 2022-04-22

## 2022-04-22 RX ORDER — TRAMADOL HYDROCHLORIDE 50 MG/1
100 TABLET ORAL EVERY 6 HOURS PRN
Qty: 240 TABLET | Refills: 0 | Status: SHIPPED | OUTPATIENT
Start: 2022-04-27 | End: 2022-05-27 | Stop reason: SDUPTHER

## 2022-04-22 NOTE — TELEPHONE ENCOUNTER
Call patient  Because of the new regulations, we need to see all patients on chronic controlled substances every 3 months or else we cannot prescribe  He last saw Sheri Cas in February and so should make an appointment to see her in May

## 2022-05-06 ENCOUNTER — TELEPHONE (OUTPATIENT)
Dept: FAMILY MEDICINE CLINIC | Facility: CLINIC | Age: 37
End: 2022-05-06

## 2022-05-17 NOTE — TELEPHONE ENCOUNTER
Attempted to call patient  Patient's mailbox is full  Will attempt to contact patient at a subsequent time

## 2022-05-19 NOTE — TELEPHONE ENCOUNTER
Attempted to call patient 2nd  Patient's mailbox is full  Will attempt to contact patient at a subsequent time

## 2022-05-25 DIAGNOSIS — M79.7 FIBROMYALGIA: ICD-10-CM

## 2022-05-25 RX ORDER — TRAMADOL HYDROCHLORIDE 50 MG/1
100 TABLET ORAL EVERY 6 HOURS PRN
Qty: 240 TABLET | Refills: 0 | Status: CANCELLED | OUTPATIENT
Start: 2022-05-25

## 2022-05-27 DIAGNOSIS — M79.7 FIBROMYALGIA: ICD-10-CM

## 2022-05-27 RX ORDER — TRAMADOL HYDROCHLORIDE 50 MG/1
100 TABLET ORAL EVERY 6 HOURS PRN
Qty: 240 TABLET | Refills: 0 | Status: SHIPPED | OUTPATIENT
Start: 2022-05-27 | End: 2022-06-27 | Stop reason: SDUPTHER

## 2022-06-27 DIAGNOSIS — M79.7 FIBROMYALGIA: ICD-10-CM

## 2022-06-28 DIAGNOSIS — M79.7 FIBROMYALGIA: ICD-10-CM

## 2022-06-28 RX ORDER — TRAMADOL HYDROCHLORIDE 50 MG/1
100 TABLET ORAL EVERY 6 HOURS PRN
Qty: 240 TABLET | Refills: 0 | Status: SHIPPED | OUTPATIENT
Start: 2022-06-28 | End: 2022-07-25 | Stop reason: SDUPTHER

## 2022-07-25 DIAGNOSIS — M79.7 FIBROMYALGIA: ICD-10-CM

## 2022-07-26 RX ORDER — TRAMADOL HYDROCHLORIDE 50 MG/1
100 TABLET ORAL EVERY 6 HOURS PRN
Qty: 240 TABLET | Refills: 0 | Status: SHIPPED | OUTPATIENT
Start: 2022-07-26 | End: 2022-08-30 | Stop reason: SDUPTHER

## 2022-08-12 ENCOUNTER — OFFICE VISIT (OUTPATIENT)
Dept: FAMILY MEDICINE CLINIC | Facility: CLINIC | Age: 37
End: 2022-08-12
Payer: COMMERCIAL

## 2022-08-12 ENCOUNTER — TELEPHONE (OUTPATIENT)
Dept: FAMILY MEDICINE CLINIC | Facility: CLINIC | Age: 37
End: 2022-08-12

## 2022-08-12 VITALS
HEART RATE: 91 BPM | WEIGHT: 183.4 LBS | TEMPERATURE: 98.5 F | DIASTOLIC BLOOD PRESSURE: 82 MMHG | SYSTOLIC BLOOD PRESSURE: 146 MMHG | HEIGHT: 69 IN | OXYGEN SATURATION: 97 % | BODY MASS INDEX: 27.16 KG/M2

## 2022-08-12 DIAGNOSIS — Z79.899 OTHER LONG TERM (CURRENT) DRUG THERAPY: ICD-10-CM

## 2022-08-12 DIAGNOSIS — Z11.4 SCREENING FOR HIV (HUMAN IMMUNODEFICIENCY VIRUS): ICD-10-CM

## 2022-08-12 DIAGNOSIS — G89.29 OTHER CHRONIC PAIN: Primary | ICD-10-CM

## 2022-08-12 DIAGNOSIS — Q79.60 EHLERS-DANLOS SYNDROME: ICD-10-CM

## 2022-08-12 DIAGNOSIS — Z13.1 SCREENING FOR DIABETES MELLITUS: ICD-10-CM

## 2022-08-12 DIAGNOSIS — Z13.0 SCREENING, ANEMIA, DEFICIENCY, IRON: ICD-10-CM

## 2022-08-12 DIAGNOSIS — F11.20 CONTINUOUS OPIOID DEPENDENCE (HCC): ICD-10-CM

## 2022-08-12 DIAGNOSIS — Z11.59 NEED FOR HEPATITIS C SCREENING TEST: ICD-10-CM

## 2022-08-12 DIAGNOSIS — M79.7 FIBROMYALGIA: ICD-10-CM

## 2022-08-12 DIAGNOSIS — Z13.220 SCREENING FOR LIPID DISORDERS: ICD-10-CM

## 2022-08-12 DIAGNOSIS — Z72.0 TOBACCO USE: ICD-10-CM

## 2022-08-12 DIAGNOSIS — R03.0 ELEVATED BLOOD PRESSURE READING: ICD-10-CM

## 2022-08-12 DIAGNOSIS — Z13.29 SCREENING FOR THYROID DISORDER: ICD-10-CM

## 2022-08-12 DIAGNOSIS — J45.20 MILD INTERMITTENT ASTHMA WITHOUT COMPLICATION: ICD-10-CM

## 2022-08-12 PROCEDURE — 99214 OFFICE O/P EST MOD 30 MIN: CPT | Performed by: NURSE PRACTITIONER

## 2022-08-12 PROCEDURE — 3725F SCREEN DEPRESSION PERFORMED: CPT | Performed by: NURSE PRACTITIONER

## 2022-08-12 RX ORDER — GABAPENTIN 100 MG/1
100 CAPSULE ORAL
Qty: 30 CAPSULE | Refills: 5 | Status: SHIPPED | OUTPATIENT
Start: 2022-08-12

## 2022-08-12 RX ORDER — GABAPENTIN 600 MG/1
600 TABLET ORAL 3 TIMES DAILY
Qty: 90 TABLET | Refills: 5 | Status: SHIPPED | OUTPATIENT
Start: 2022-08-12

## 2022-08-12 RX ORDER — BUPROPION HYDROCHLORIDE 100 MG/1
100 TABLET, EXTENDED RELEASE ORAL 2 TIMES DAILY
Qty: 60 TABLET | Refills: 2 | Status: SHIPPED | OUTPATIENT
Start: 2022-08-12

## 2022-08-12 NOTE — ASSESSMENT & PLAN NOTE
Continues to smoke-half to 3/4 pack per day  Is interested in quitting  Tried to get Chantix filled, but pharmacy would not due to recall  We did discuss other options today  Patient agreeable to trialing Wellbutrin  Rx sent  He will let me know in the next few weeks how he is feeling/doing with his cessation efforts    Also discussed adding nicotine patch if needed

## 2022-08-12 NOTE — PATIENT INSTRUCTIONS
Goals of care:  Maximize your health and quality of life by:   · Increasing your level of function and activity  · Decreasing the negative effects of pain on your life  · Minimizing the risks and side effects of medications and ensuring safe use of opioid medication     Ways for you to help meet your goals:  Maintain a healthy lifestyle  This includes proper nutrition, regular physical activity as able, try for 8 hours of sleep per night, use stress reduction strategies, avoid triggers  Risks and side effects of opioid use:  Prescription opioids carry serious risks of addiction and  overdose, especially with prolonged use  An opioid overdose,  often marked by slowed breathing, can cause sudden death  The  use of prescription opioids can have a number of side effects as  well, even when taken as directed:  · Tolerance--meaning you might need to take more of a medication for the same pain relief  · Physical dependence--meaning you have symptoms of withdrawal when a medication is stopped  · Increased sensitivity to pain  · Constipation  · Nausea, vomiting, dry mouth  · Sleepiness and dizziness   · Confusion  · Depression  · Low levels of testosterone that can result in lower sex drive, energy, and strength  · Itching and sweating    If you are prescribed opioids for pain:  · Never take opioids in greater amounts or more often than prescribed  · Help prevent misuse and abuse  - Never sell or share prescription opioids         - Never use another persons prescription opioids  · Store prescription opioids in a secure place and out of reach of others (this may include visitors, children, friends, and family)  · Safely dispose of unused prescription opioids: Find your community drug take-back program or your pharmacy mail-back program, or flush them down the toilet, following guidance from the Food and Drug Administration (www fda gov/Drugs/ResourcesForYou)    · Visit www cdc gov/drugoverdose to learn about the risks of opioid abuse and overdose  · If you believe you may be struggling with addiction, tell your health care provider and ask for guidance or call [address] at 6-380-188-HELP  Goals of care:  Maximize your health and quality of life by: Increasing your level of function and activity  Decreasing the negative effects of pain on your life  Minimizing the risks and side effects of medications and ensuring safe use of opioid medication     Ways for you to help meet your goals:  Maintain a healthy lifestyle  This includes proper nutrition, regular physical activity as able, try for 8 hours of sleep per night, use stress reduction strategies, avoid triggers  Risks and side effects of opioid use:  Prescription opioids carry serious risks of addiction and  overdose, especially with prolonged use  An opioid overdose,  often marked by slowed breathing, can cause sudden death  The  use of prescription opioids can have a number of side effects as  well, even when taken as directed: Tolerance--meaning you might need to take more of a medication for the same pain relief  Physical dependence--meaning you have symptoms of withdrawal when a medication is stopped  Increased sensitivity to pain  Constipation  Nausea, vomiting, dry mouth  Sleepiness and dizziness   Confusion  Depression  Low levels of testosterone that can result in lower sex drive, energy, and strength  Itching and sweating    If you are prescribed opioids for pain:  Never take opioids in greater amounts or more often than prescribed  Help prevent misuse and abuse  - Never sell or share prescription opioids         - Never use another persons prescription opioids  Store prescription opioids in a secure place and out of reach of others (this may include visitors, children, friends, and family)    Safely dispose of unused prescription opioids: Find your community drug take-back program or your pharmacy mail-back program, or flush them down the toilet, following guidance from the Food and Drug Administration (www fda gov/Drugs/ResourcesForYou)  Visit www cdc gov/drugoverdose to learn about the risks of opioid abuse and overdose  If you believe you may be struggling with addiction, tell your health care provider and ask for guidance or call [address] at 3-729-141-ACQF

## 2022-08-12 NOTE — PROGRESS NOTES
Assessment/Plan     Problem List Items Addressed This Visit        Respiratory    Asthma     Stable  Infrequent use of albuterol rescue inhaler-maybe 1-2 times per month  Musculoskeletal and Integument    Starr-Danlos syndrome     Doing fairly well on current treatment  Ultram schedule: 150mg in AM-150mg at lunch-100mg at HS   Gabapentin as follows:  600 mg in a m  600 mg in afternoon -700 mg at HS    Occasional joint pain-primarily in hands and wrists  Occasional back  Will continue current treatment plan  New contract signed and urine collected  Years since any specialist follow up for his condition  Advise re-establish with Rheum to ensure we are doing all appropriate and current treatment  recommendations  Will also check Echo and Aorta US  Relevant Orders    Ambulatory Referral to Rheumatology    Echo complete w/ contrast if indicated    US abdominal aorta screening aaa       Other    Elevated blood pressure reading     Continues to fluctuate  136/86 today  Note patient does come to his 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-and has had some caffeine  Advised to check his pressure outside of the office over the next few weeks  He will let me know through my chart what his home pressures average  Will continue to monitor closely  Fibromyalgia    Relevant Medications    gabapentin (NEURONTIN) 600 MG tablet    gabapentin (NEURONTIN) 100 mg capsule    Other Relevant Orders    Ambulatory Referral to Rheumatology    Continuous opioid dependence [facility])     New contract signed in urine obtained  Narcan discussed and sent         Tobacco use     Continues to smoke-half to 3/4 pack per day  Is interested in quitting  Tried to get Chantix filled, but pharmacy would not due to recall  We did discuss other options today  Patient agreeable to trialing Wellbutrin  Rx sent  He will let me know in the next few weeks how he is feeling/doing with his cessation efforts    Also discussed adding nicotine patch if needed         Relevant Medications    buPROPion (Wellbutrin SR) 100 mg 12 hr tablet    Other Relevant Orders    CBC and differential      Other Visit Diagnoses     Other chronic pain    -  Primary    Relevant Orders    [facility] All Prescribed Meds    Amphetamines, Methamphetamines    Butalbital    Phenobarbital    Secobarbital    Temazepam    Alprazolam    Clonazepam    Diazepam    Lorazepam    Oxazepam    Gabapentin    Pregabalin    Cocaine    Heroin    Buprenorphine    Levorphanol    Meperidine    Naltrexone    Fentanyl    Methadone    Oxycodone    Oxymorphone    Tapentadol    THC    Tramadol    Codeine, Hydrocodone, Hydropmorphone, Morphine    Bath Salts    Ethyl Glucuronide/Ethyl Sulfate    Kratom    Spice    Methylphenidate    Phentermine    Validity Oxidant    Validity Creatinine    Validity pH    Validity Specific    Need for hepatitis C screening test        Relevant Orders    Hepatitis C antibody    Screening for thyroid disorder        Relevant Orders    TSH, 3rd generation with Free T4 reflex    Screening for diabetes mellitus        Relevant Orders    Comprehensive metabolic panel    Screening for lipid disorders        Relevant Orders    Lipid panel    Screening, anemia, deficiency, iron        Relevant Orders    CBC and differential    Screening for HIV (human immunodeficiency virus)        Relevant Orders    HIV 1/2 Antigen/Antibody (4th Generation) w Reflex [facility]    Other long term (current) drug therapy             Opioid Management Plan      Subjective     Opioid Management HPI  HPI  Pain Medications             aspirin-acetaminophen-caffeine (EXCEDRIN MIGRAINE) 250-250-65 MG per tablet Take 3 tablets by mouth daily    buPROPion (Wellbutrin SR) 100 mg 12 hr tablet Take 1 tablet (100 mg total) by mouth 2 (two) times a day    gabapentin (NEURONTIN) 100 mg capsule Take 1 capsule (100 mg total) by mouth daily at bedtime    gabapentin (NEURONTIN) 600 MG tablet Take 1 tablet (600 mg total) by mouth 3 (three) times a day    traMADol (ULTRAM) 50 mg tablet Take 2 tablets (100 mg total) by mouth every 6 (six) hours as needed for moderate pain         Outpatient Morphine Milligram Equivalents Per Day     8/12/22 and after 40 MME/Day    Order Name Dose Route Frequency Maximum MME/Day     traMADol (ULTRAM) 50 mg tablet 100 mg Oral Every 6 hours PRN 40 MME/Day    Total Potential Morphine Milligram Equivalents Per Day 40 MME/Day    Calculation Information        traMADol (ULTRAM) 50 mg tablet    traMADol 50 mg Tabs: single dose of 100 mg * 4 doses per day * morphine equivalence factor of 0 1 = 40 MME/Day                         PDMP Review       Value Time User    PDMP Reviewed  Yes 6/28/2022  2:28 PM JERRICA Deleon         Review of Systems  Objective     /82 (BP Location: Left arm, Patient Position: Sitting, Cuff Size: Standard)   Pulse 91   Temp 98 5 °F (36 9 °C) (Temporal)   Ht 5' 9" (1 753 m)   Wt 83 2 kg (183 lb 6 4 oz)   SpO2 97%   BMI 27 08 kg/m²     Physical Exam    JERRICA Deleon

## 2022-08-12 NOTE — ASSESSMENT & PLAN NOTE
Doing fairly well on current treatment  Ultram schedule: 150mg in AM-150mg at lunch-100mg at HS   Gabapentin as follows:  600 mg in a m  600 mg in afternoon -700 mg at HS    Occasional joint pain-primarily in hands and wrists  Occasional back  Will continue current treatment plan  New contract signed and urine collected  Years since any specialist follow up for his condition  Advise re-establish with Rheum to ensure we are doing all appropriate and current treatment  recommendations  Will also check Echo and Aorta US

## 2022-08-12 NOTE — TELEPHONE ENCOUNTER
Drug urine screen collected and placed on back counter of reception area   UPS contacted via phone and will p/u 8/13 between 9-3pm

## 2022-08-12 NOTE — ASSESSMENT & PLAN NOTE
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

## 2022-08-12 NOTE — PROGRESS NOTES
Assessment/Plan     Problem List Items Addressed This Visit        Respiratory    Asthma     Stable  Infrequent use of albuterol rescue inhaler-maybe 1-2 times per month  Musculoskeletal and Integument    Starr-Danlos syndrome - Primary     Doing fairly well on current treatment  Ultram schedule: 150mg in AM-150mg at lunch-100mg at HS   Gabapentin as follows:  600 mg in a m  600 mg in afternoon -700 mg at HS    Occasional joint pain-primarily in hands and wrists  Occasional back  Will continue current treatment plan  New contract signed and urine collected  Years since any specialist follow up for his condition  Advise re-establish with Rheum to ensure we are doing all appropriate and current treatment  recommendations  Will also check Echo and Aorta US  Relevant Orders    Ambulatory Referral to Rheumatology    Echo complete w/ contrast if indicated    US abdominal aorta screening aaa       Other    Elevated blood pressure reading     Continues to fluctuate  136/86 today  Note patient does come to his 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-and has had some caffeine  Advised to check his pressure outside of the office over the next few weeks  He will let me know through my chart what his home pressures average  Will continue to monitor closely  Fibromyalgia    Relevant Medications    gabapentin (NEURONTIN) 600 MG tablet    gabapentin (NEURONTIN) 100 mg capsule    Other Relevant Orders    Ambulatory Referral to Rheumatology    Continuous opioid dependence [facility])     New contract signed in urine obtained  Narcan discussed and sent         Tobacco use     Continues to smoke-half to 3/4 pack per day  Is interested in quitting  Tried to get Chantix filled, but pharmacy would not due to recall  We did discuss other options today  Patient agreeable to trialing Wellbutrin  Rx sent  He will let me know in the next few weeks how he is feeling/doing with his cessation efforts  Also discussed adding nicotine patch if needed         Relevant Medications    buPROPion (Wellbutrin SR) 100 mg 12 hr tablet    Other Relevant Orders    CBC and differential      Other Visit Diagnoses     Need for hepatitis C screening test        Relevant Orders    Hepatitis C antibody    Screening for thyroid disorder        Relevant Orders    TSH, 3rd generation with Free T4 reflex    Screening for diabetes mellitus        Relevant Orders    Comprehensive metabolic panel    Screening for lipid disorders        Relevant Orders    Lipid panel    Screening, anemia, deficiency, iron        Relevant Orders    CBC and differential    Screening for HIV (human immunodeficiency virus)        Relevant Orders    HIV 1/2 Antigen/Antibody (4th Generation) w Reflex [facility]    Other chronic pain        Other long term (current) drug therapy             Treatment Plan: Continue current medication plan  Re-eval / establish with rheum for further treatment if indicated    Treatment Goals: Pain relief and maintain function    Opiate risks  There are risks associated with opioid medications, including dependence, addiction and tolerance  The patient understands and agrees to use these medications only as prescribed  Potential side effects of the medications include, but are not limited to, constipation, drowsiness, addiction, impaired judgment and risk of fatal overdose if not taken as prescribed  The patient was warned against driving while taking sedation medications  Sharing medications is a felony  At this point in time, the patient is showing no signs of addiction, abuse, diversion or suicidal ideation  Patient is taking concurrent muscle relaxers  Has been counseled on the risks of taking opioids and muscle relaxers including sedation, increased fall risk, dizziness, addictive potential and death        Patient has a high risk condition (age > 72, JANA, renal or hepatic impairment, mental health condition, use of alcohol or other substances)  Has been counseled on the specific risks of taking opioids with these conditions and the increased risks including including sedation, increased fall risk, dizziness, addictive potential and death  Opioid agreement  Pain management agreement was reviewed with patient and signed/updated during visit      Drug screen  Drug screen collected during today's visit      PDMP review  [place] or [place]  reviewed  No red flags were identified; safe to proceed with prescription      Greater than 50% of this time was spent in counseling/coordination of care regarding: risks and benefits of treatment options, instructions for management and patient and family education  BMI Counseling: Body mass index is 27 08 kg/m²  The BMI is above normal  Nutrition recommendations include decreasing portion sizes, consuming healthier snacks, moderation in carbohydrate intake, increasing intake of lean protein and reducing intake of saturated and trans fat  Exercise recommendations include moderate physical activity 150 minutes/week  Rationale for BMI follow-up plan is due to patient being overweight or obese  Healthy lifestyle counseling provider    Depression Screening and Follow-up Plan: Patient was screened for depression during today's encounter  They screened negative with a PHQ-2 score of 0  Tobacco Cessation Counseling: Tobacco cessation counseling was provided  The patient is sincerely urged to quit consumption of tobacco  He is ready to quit tobacco  Medication options and side effects of medication discussed  Patient refused medication  Bupropion SR and nicotine patch was prescribed  Subjective     Opioid Management:   Type of visit: Initial    Jelani Martinez - dx at age 14 through [address]  [place] [facility] - dx at age 14    Current pain description: Pt describes as constant joint and muscle pain  Extremities numbness and tingling  All joints but more in back   Hands, elbows and hips worsening  Functional status: Feels able to perform daily activities without issue with medication  Unable to function without pain medication  Misses work if medication is dose missed  Describes pain as completely unbearable without  Physically unable to move properly    Goals of care: Pain relief and maintain function    Social Support System  Patient receives support from their: mother and significant other    Screening Tools/Assessments:    PHQ-2/9:  PHQ-2 score: 0      Drug Screen:  Last drug screen was performed more than 365 days ago    Opioid agreement:  Active Opioid agreement on file?: Yes    Opioid agreement signed date: 8/19/2021  Opioid agreement expiration date: 8/19/2022    Naloxone:  Currently prescribed Naloxone (Narcan): No      Other treatments tried/failed:   Rest, heat, cold compresses, acetaminophen, ibuprofen (OTC), muscle relaxants, physical therapy, other and home exercises    Other treatments tried/failed: aqua therapy    routine check   Opioid visit    Pain Medications             aspirin-acetaminophen-caffeine (EXCEDRIN MIGRAINE) 250-250-65 MG per tablet Take 3 tablets by mouth daily    buPROPion (Wellbutrin SR) 100 mg 12 hr tablet Take 1 tablet (100 mg total) by mouth 2 (two) times a day    gabapentin (NEURONTIN) 100 mg capsule Take 1 capsule (100 mg total) by mouth daily at bedtime    gabapentin (NEURONTIN) 600 MG tablet Take 1 tablet (600 mg total) by mouth 3 (three) times a day    traMADol (ULTRAM) 50 mg tablet Take 2 tablets (100 mg total) by mouth every 6 (six) hours as needed for moderate pain         Outpatient Morphine Milligram Equivalents Per Day     8/12/22 and after 40 MME/Day    Order Name Dose Route Frequency Maximum MME/Day     traMADol (ULTRAM) 50 mg tablet 100 mg Oral Every 6 hours PRN 40 MME/Day    Total Potential Morphine Milligram Equivalents Per Day 40 MME/Day    Calculation Information        traMADol (ULTRAM) 50 mg tablet    traMADol 50 mg Tabs: single dose of 100 mg * 4 doses per day * morphine equivalence factor of 0 1 = 40 MME/Day                         PDMP Review       Value Time User    PDMP Reviewed  Yes 6/28/2022  2:28 PM JERRICA Aguilar         Review of Systems   Constitutional: Negative  Respiratory: Negative  Cardiovascular: Negative  Musculoskeletal:        Chronic pain   Neurological: Negative  Psychiatric/Behavioral: Negative  Objective     /82 (BP Location: Left arm, Patient Position: Sitting, Cuff Size: Standard)   Pulse 91   Temp 98 5 °F (36 9 °C) (Temporal)   Ht 5' 9" (1 753 m)   Wt 83 2 kg (183 lb 6 4 oz)   SpO2 97%   BMI 27 08 kg/m²     Physical Exam  Vitals and nursing note reviewed  Constitutional:       General: He is not in acute distress  Appearance: Normal appearance  He is well-developed and well-groomed  He is not ill-appearing  HENT:      Head: Normocephalic  Right Ear: Tympanic membrane, ear canal and external ear normal       Left Ear: Tympanic membrane, ear canal and external ear normal       Nose: Nose normal       Mouth/Throat:      Mouth: Mucous membranes are moist       Pharynx: Oropharynx is clear  Eyes:      Conjunctiva/sclera: Conjunctivae normal       Pupils: Pupils are equal, round, and reactive to light  Neck:      Thyroid: No thyromegaly  Vascular: No carotid bruit  Cardiovascular:      Rate and Rhythm: Normal rate and regular rhythm  Heart sounds: Normal heart sounds  Pulmonary:      Effort: Pulmonary effort is normal  No respiratory distress  Breath sounds: Normal breath sounds and air entry  Abdominal:      General: Bowel sounds are normal       Palpations: Abdomen is soft  Tenderness: There is no abdominal tenderness  Musculoskeletal:      Right lower leg: No edema  Left lower leg: No edema  Lymphadenopathy:      Cervical: No cervical adenopathy  Skin:     General: Skin is warm and dry     Neurological:      General: No focal deficit present  Mental Status: He is alert and oriented to person, place, and time  Psychiatric:         Attention and Perception: Attention normal          Mood and Affect: Mood normal          Behavior: Behavior normal          Thought Content:  Thought content normal          Judgment: Judgment normal          JERRICA Gleason

## 2022-08-17 LAB
6MAM UR QL CFM: NEGATIVE NG/ML
7AMINOCLONAZEPAM UR QL CFM: NEGATIVE NG/ML
A-OH ALPRAZ UR QL CFM: NEGATIVE NG/ML
ACCEPTABLE CREAT UR QL: NORMAL MG/DL
ACCEPTIBLE SP GR UR QL: NORMAL
AMPHET UR QL CFM: NEGATIVE NG/ML
BUPRENORPHINE UR QL CFM: NEGATIVE NG/ML
BUTALBITAL UR QL CFM: NEGATIVE NG/ML
BZE UR QL CFM: NEGATIVE NG/ML
CODEINE UR QL CFM: NEGATIVE NG/ML
EDDP UR QL CFM: NEGATIVE NG/ML
ETHYL GLUCURONIDE UR QL CFM: ABNORMAL NG/ML
ETHYL SULFATE UR QL SCN: NEGATIVE NG/ML
EUTYLONE UR QL: NEGATIVE NG/ML
FENTANYL UR QL CFM: NEGATIVE NG/ML
GLIADIN IGG SER IA-ACNC: NEGATIVE NG/ML
HYDROCODONE UR QL CFM: NEGATIVE NG/ML
HYDROMORPHONE UR QL CFM: NEGATIVE NG/ML
LORAZEPAM UR QL CFM: NEGATIVE NG/ML
ME-PHENIDATE UR QL CFM: NEGATIVE NG/ML
MEPERIDINE UR QL CFM: NEGATIVE NG/ML
METHADONE UR QL CFM: NEGATIVE NG/ML
METHAMPHET UR QL CFM: NEGATIVE NG/ML
MORPHINE UR QL CFM: NEGATIVE NG/ML
NALTREXONE UR QL CFM: NEGATIVE NG/ML
NITRITE UR QL: NORMAL UG/ML
NORBUPRENORPHINE UR QL CFM: NEGATIVE NG/ML
NORDIAZEPAM UR QL CFM: NEGATIVE NG/ML
NORFENTANYL UR QL CFM: NEGATIVE NG/ML
NORHYDROCODONE UR QL CFM: NEGATIVE NG/ML
NORMEPERIDINE UR QL CFM: NEGATIVE NG/ML
NOROXYCODONE UR QL CFM: NEGATIVE NG/ML
OXAZEPAM UR QL CFM: NEGATIVE NG/ML
OXYCODONE UR QL CFM: NEGATIVE NG/ML
OXYMORPHONE UR QL CFM: NEGATIVE NG/ML
OXYMORPHONE UR QL CFM: NEGATIVE NG/ML
PARA-FLUOROFENTANYL QUANTIFICATION: NORMAL NG/ML
PHENOBARB UR QL CFM: NEGATIVE NG/ML
RESULT ALL_PRESCRIBED MEDS AND SPECIAL INSTRUCTIONS: NORMAL
SECOBARBITAL UR QL CFM: NEGATIVE NG/ML
SL AMB 4-ANPP QUANTIFICATION: NORMAL NG/ML
SL AMB 5F-ADB-M7 METABOLITE QUANTIFICATION: NEGATIVE NG/ML
SL AMB 7-OH-MITRAGYNINE (KRATOM ALKALOID) QUANTIFICATION: NEGATIVE NG/ML
SL AMB AB-FUBINACA-M3 METABOLITE QUANTIFICATION: NEGATIVE NG/ML
SL AMB ACETYL FENTANYL QUANTIFICATION: NORMAL NG/ML
SL AMB ACETYL NORFENTANYL QUANTIFICATION: NORMAL NG/ML
SL AMB ACRYL FENTANYL QUANTIFICATION: NORMAL NG/ML
SL AMB CARFENTANIL QUANTIFICATION: NORMAL NG/ML
SL AMB CTHC (MARIJUANA METABOLITE) QUANTIFICATION: NEGATIVE NG/ML
SL AMB DEXTRORPHAN (DEXTROMETHORPHAN METABOLITE) QUANT: NEGATIVE NG/ML
SL AMB GABAPENTIN QUANTIFICATION: NORMAL
SL AMB JWH018 METABOLITE QUANTIFICATION: NEGATIVE NG/ML
SL AMB JWH073 METABOLITE QUANTIFICATION: NEGATIVE NG/ML
SL AMB MDMB-FUBINACA-M1 METABOLITE QUANTIFICATION: NEGATIVE NG/ML
SL AMB METHYLONE QUANTIFICATION: NEGATIVE NG/ML
SL AMB N-DESMETHYL-TRAMADOL QUANTIFICATION: NORMAL NG/ML
SL AMB PHENTERMINE QUANTIFICATION: NEGATIVE NG/ML
SL AMB PREGABALIN QUANTIFICATION: NEGATIVE
SL AMB RCS4 METABOLITE QUANTIFICATION: NEGATIVE NG/ML
SL AMB RITALINIC ACID QUANTIFICATION: NEGATIVE NG/ML
SMOOTH MUSCLE AB TITR SER IF: NEGATIVE NG/ML
SPECIMEN DRAWN SERPL: NEGATIVE NG/ML
SPECIMEN PH ACCEPTABLE UR: NORMAL
TAPENTADOL UR QL CFM: NEGATIVE NG/ML
TEMAZEPAM UR QL CFM: NEGATIVE NG/ML
TEMAZEPAM UR QL CFM: NEGATIVE NG/ML
TRAMADOL UR QL CFM: NORMAL NG/ML
URATE/CREAT 24H UR: NORMAL NG/ML

## 2022-08-29 DIAGNOSIS — M79.7 FIBROMYALGIA: ICD-10-CM

## 2022-08-29 RX ORDER — TRAMADOL HYDROCHLORIDE 50 MG/1
100 TABLET ORAL EVERY 6 HOURS PRN
Qty: 240 TABLET | Refills: 0 | Status: CANCELLED | OUTPATIENT
Start: 2022-08-29

## 2022-08-30 DIAGNOSIS — M79.7 FIBROMYALGIA: ICD-10-CM

## 2022-08-30 DIAGNOSIS — Q79.60 EHLERS-DANLOS SYNDROME: Primary | ICD-10-CM

## 2022-08-30 RX ORDER — TRAMADOL HYDROCHLORIDE 50 MG/1
100 TABLET ORAL EVERY 6 HOURS PRN
Qty: 240 TABLET | Refills: 0 | Status: SHIPPED | OUTPATIENT
Start: 2022-08-30 | End: 2022-09-28 | Stop reason: SDUPTHER

## 2022-08-30 NOTE — TELEPHONE ENCOUNTER
Pt's SO called very upset stating that she called yesterday and was promised to have this medication out yesterday before end of day and that she called again around 7:30 and no one picked up  She said pt is in severe pain and is bed ridden  I told her that I can't guarantee when the medication is done but I can promise that I will put it out as high priority and note that he is in pain and out of the medication   Right before she hung up, I heard her say "I wonder what the grounds for suing are, this is sh*t" and hung up

## 2022-08-30 NOTE — TELEPHONE ENCOUNTER
Michelle quiroga note: I just received this message - this is the first refill request I have received  Rx has been refilled

## 2022-08-31 NOTE — TELEPHONE ENCOUNTER
Vm received from pt stating he was out of Tramadol and this is his third day in a row calling, along with his partner and pharmacy  Chart notes that this medication was refilled on 8/30/22 with a quantity of 240 tablets sent to [id number] [place], [place] - [id number]  Pt was called and VM was unable to be left due to VM being full

## 2022-09-28 DIAGNOSIS — Q79.60 EHLERS-DANLOS SYNDROME: ICD-10-CM

## 2022-09-28 DIAGNOSIS — M79.7 FIBROMYALGIA: ICD-10-CM

## 2022-09-28 RX ORDER — TRAMADOL HYDROCHLORIDE 50 MG/1
100 TABLET ORAL EVERY 6 HOURS PRN
Qty: 240 TABLET | Refills: 0 | Status: SHIPPED | OUTPATIENT
Start: 2022-09-28 | End: 2022-10-26 | Stop reason: SDUPTHER

## 2022-10-26 DIAGNOSIS — Q79.60 EHLERS-DANLOS SYNDROME: ICD-10-CM

## 2022-10-26 DIAGNOSIS — M79.7 FIBROMYALGIA: ICD-10-CM

## 2022-10-26 RX ORDER — TRAMADOL HYDROCHLORIDE 50 MG/1
100 TABLET ORAL EVERY 6 HOURS PRN
Qty: 240 TABLET | Refills: 0 | Status: SHIPPED | OUTPATIENT
Start: 2022-10-26 | End: 2022-11-18 | Stop reason: SDUPTHER

## 2022-11-18 DIAGNOSIS — Q79.60 EHLERS-DANLOS SYNDROME: ICD-10-CM

## 2022-11-18 DIAGNOSIS — M79.7 FIBROMYALGIA: ICD-10-CM

## 2022-11-18 RX ORDER — TRAMADOL HYDROCHLORIDE 50 MG/1
100 TABLET ORAL EVERY 6 HOURS PRN
Qty: 240 TABLET | Refills: 0 | Status: SHIPPED | OUTPATIENT
Start: 2022-11-25 | End: 2022-12-28 | Stop reason: SDUPTHER

## 2022-12-16 DIAGNOSIS — Z72.0 TOBACCO USE: ICD-10-CM

## 2022-12-16 RX ORDER — BUPROPION HYDROCHLORIDE 100 MG/1
100 TABLET, EXTENDED RELEASE ORAL 2 TIMES DAILY
Qty: 60 TABLET | Refills: 2 | Status: SHIPPED | OUTPATIENT
Start: 2022-12-16

## 2022-12-28 DIAGNOSIS — M79.7 FIBROMYALGIA: ICD-10-CM

## 2022-12-28 DIAGNOSIS — Q79.60 EHLERS-DANLOS SYNDROME: ICD-10-CM

## 2022-12-28 RX ORDER — TRAMADOL HYDROCHLORIDE 50 MG/1
100 TABLET ORAL EVERY 6 HOURS PRN
Qty: 240 TABLET | Refills: 0 | Status: CANCELLED | OUTPATIENT
Start: 2022-12-28

## 2022-12-28 RX ORDER — TRAMADOL HYDROCHLORIDE 50 MG/1
100 TABLET ORAL EVERY 6 HOURS PRN
Qty: 240 TABLET | Refills: 0 | Status: SHIPPED | OUTPATIENT
Start: 2022-12-28 | End: 2023-01-26 | Stop reason: SDUPTHER

## 2023-01-25 ENCOUNTER — TELEPHONE (OUTPATIENT)
Dept: RHEUMATOLOGY | Facility: CLINIC | Age: 38
End: 2023-01-25

## 2023-01-25 NOTE — TELEPHONE ENCOUNTER
Dr Steele How unavailable for appointment today  Left message for patient, he is on priority for new patient appointment with Rheumatology  Left call back number relaying information  Appointment cancelled

## 2023-01-26 DIAGNOSIS — Q79.60 EHLERS-DANLOS SYNDROME: ICD-10-CM

## 2023-01-26 DIAGNOSIS — M79.7 FIBROMYALGIA: ICD-10-CM

## 2023-01-26 RX ORDER — TRAMADOL HYDROCHLORIDE 50 MG/1
100 TABLET ORAL EVERY 6 HOURS PRN
Qty: 240 TABLET | Refills: 0 | Status: SHIPPED | OUTPATIENT
Start: 2023-01-28 | End: 2023-02-28 | Stop reason: SDUPTHER

## 2023-02-07 ENCOUNTER — TELEPHONE (OUTPATIENT)
Dept: FAMILY MEDICINE CLINIC | Facility: CLINIC | Age: 38
End: 2023-02-07

## 2023-02-07 ENCOUNTER — OFFICE VISIT (OUTPATIENT)
Dept: FAMILY MEDICINE CLINIC | Facility: CLINIC | Age: 38
End: 2023-02-07

## 2023-02-07 VITALS
OXYGEN SATURATION: 97 % | DIASTOLIC BLOOD PRESSURE: 86 MMHG | BODY MASS INDEX: 24.71 KG/M2 | TEMPERATURE: 97.4 F | HEART RATE: 78 BPM | HEIGHT: 70 IN | SYSTOLIC BLOOD PRESSURE: 128 MMHG | WEIGHT: 172.6 LBS

## 2023-02-07 DIAGNOSIS — Z72.0 TOBACCO USE: ICD-10-CM

## 2023-02-07 DIAGNOSIS — R21 RASH: ICD-10-CM

## 2023-02-07 DIAGNOSIS — Q79.60 EHLERS-DANLOS SYNDROME: Primary | ICD-10-CM

## 2023-02-07 DIAGNOSIS — Z78.9 ALCOHOL USE: ICD-10-CM

## 2023-02-07 DIAGNOSIS — F41.8 DEPRESSION WITH ANXIETY: ICD-10-CM

## 2023-02-07 RX ORDER — TRIAMCINOLONE ACETONIDE 1 MG/G
CREAM TOPICAL 2 TIMES DAILY
Qty: 30 G | Refills: 1 | Status: SHIPPED | OUTPATIENT
Start: 2023-02-07

## 2023-02-07 NOTE — ASSESSMENT & PLAN NOTE
Possible insect bites - sleeps with his dog  May have fleas  Trail Kenalog cream  Follow up if persistent   Consider Derm referral    May need to treat home for infestation

## 2023-02-07 NOTE — ASSESSMENT & PLAN NOTE
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

## 2023-02-07 NOTE — ASSESSMENT & PLAN NOTE
Reports on/off for years  Denies any SI/HI  Piter Gomez Already on Wellbutrin - he will increase dose to BID (admits to inconsistent with PM dose)  He is agreeable to trial therapy - referral placed  F/U here 6 weeks  May trial adding SSRI   F/U  sooner with concerns

## 2023-02-07 NOTE — ASSESSMENT & PLAN NOTE
Doing fairly well on current treatment  Ultram schedule: 150mg in AM-150mg at lunch-100mg at HS   Gabapentin as follows:  600 mg in a m  600 mg in afternoon -700 mg at HS    Occasional joint pain-primarily in hands and wrists  Occasional back  Earmathieu Garay continue current treatment plan  Contract in place     Years since any specialist follow up for his condition  Advise re-establish with Rheum to ensure we are doing all appropriate and current treatment  recommendations  Had appt scheduled - was canceled by provider  He has not rescheduled yet - Will have staff assist in rescheduling  Discussed importance of eval - may be better, alternative treatment options  Echo and Aorta US still pending

## 2023-02-15 DIAGNOSIS — M79.7 FIBROMYALGIA: ICD-10-CM

## 2023-02-15 RX ORDER — GABAPENTIN 600 MG/1
600 TABLET ORAL 3 TIMES DAILY
Qty: 90 TABLET | Refills: 5 | Status: SHIPPED | OUTPATIENT
Start: 2023-02-15

## 2023-02-28 DIAGNOSIS — M79.7 FIBROMYALGIA: ICD-10-CM

## 2023-02-28 DIAGNOSIS — Q79.60 EHLERS-DANLOS SYNDROME: ICD-10-CM

## 2023-02-28 RX ORDER — TRAMADOL HYDROCHLORIDE 50 MG/1
100 TABLET ORAL EVERY 6 HOURS PRN
Qty: 240 TABLET | Refills: 0 | Status: SHIPPED | OUTPATIENT
Start: 2023-02-28 | End: 2023-03-22 | Stop reason: SDUPTHER

## 2023-02-28 RX ORDER — TRAMADOL HYDROCHLORIDE 50 MG/1
100 TABLET ORAL EVERY 6 HOURS PRN
Qty: 240 TABLET | Refills: 0 | Status: CANCELLED | OUTPATIENT
Start: 2023-02-28

## 2023-02-28 NOTE — TELEPHONE ENCOUNTER
Call pt  Advise he call Rheumatology to reschedule the visit he had to cancel   This was discussed at his last visit

## 2023-03-17 ENCOUNTER — TELEPHONE (OUTPATIENT)
Dept: PSYCHIATRY | Facility: CLINIC | Age: 38
End: 2023-03-17

## 2023-03-21 ENCOUNTER — TELEPHONE (OUTPATIENT)
Dept: PSYCHIATRY | Facility: CLINIC | Age: 38
End: 2023-03-21

## 2023-03-21 NOTE — TELEPHONE ENCOUNTER
Received voicemail from intake department due to needing therapy for alcohol abuse  Attempted to contact patient, however he did not answer and voicemail was full  Will try again later

## 2023-03-22 DIAGNOSIS — M79.7 FIBROMYALGIA: ICD-10-CM

## 2023-03-22 DIAGNOSIS — Q79.60 EHLERS-DANLOS SYNDROME: ICD-10-CM

## 2023-03-22 RX ORDER — TRAMADOL HYDROCHLORIDE 50 MG/1
100 TABLET ORAL EVERY 6 HOURS PRN
Qty: 240 TABLET | Refills: 0 | Status: CANCELLED | OUTPATIENT
Start: 2023-03-22

## 2023-03-22 RX ORDER — TRAMADOL HYDROCHLORIDE 50 MG/1
100 TABLET ORAL EVERY 6 HOURS PRN
Qty: 240 TABLET | Refills: 0 | Status: SHIPPED | OUTPATIENT
Start: 2023-03-28 | End: 2023-04-01 | Stop reason: SDUPTHER

## 2023-03-22 NOTE — TELEPHONE ENCOUNTER
Call pt  Refill not due until 3/28  Will fill for then  Please also advise pt that there are tests pending that he has not completed yet  As well as a referral to Rheumatology  He needs to schedule these now, as we discussed at his last visit, so we can ensure that we are providing the most appropriate treatments for his condition

## 2023-04-01 ENCOUNTER — NURSE TRIAGE (OUTPATIENT)
Dept: OTHER | Facility: OTHER | Age: 38
End: 2023-04-01

## 2023-04-01 DIAGNOSIS — M79.7 FIBROMYALGIA: ICD-10-CM

## 2023-04-01 DIAGNOSIS — Q79.60 EHLERS-DANLOS SYNDROME: ICD-10-CM

## 2023-04-01 RX ORDER — TRAMADOL HYDROCHLORIDE 50 MG/1
100 TABLET ORAL EVERY 6 HOURS PRN
Qty: 240 TABLET | Refills: 0 | Status: SHIPPED | OUTPATIENT
Start: 2023-04-01 | End: 2023-04-28 | Stop reason: SDUPTHER

## 2023-04-01 NOTE — TELEPHONE ENCOUNTER
"  Reason for Disposition  • [1] Prescription not at pharmacy AND [2] was prescribed by PCP recently (Exception: triager has access to EMR and prescription is recorded there  Go to Home Care and confirm for pharmacy )    Answer Assessment - Initial Assessment Questions  1  NAME of MEDICATION: \"What medicine are you calling about? \"      Tramadol    2  QUESTION: Iglesia Guan is your question? \" (e g , medication refill, side effect)  Shortage of Tramadol and out of stock    3  PRESCRIBING HCP: \"Who prescribed it? \" Reason: if prescribed by specialist, call should be referred to that group  Devin Chiu    4  SYMPTOMS: \"Do you have any symptoms? \"      Denies    Protocols used: MEDICATION QUESTION CALL-ADULT-    Requesting that Tramadol be transferred to [facility] pharmacy [facility]  [facility] does not have medication in stock and patient is completely out of medication  Paged on call provider  Provider stated he sent script  Patients spouse made aware     "

## 2023-04-01 NOTE — TELEPHONE ENCOUNTER
"Regarding: pharmacy doesn't have Tramadol in stock  ----- Message from Rui Bar sent at 4/1/2023 10:54 AM EDT -----  \"My Tramadol script was sent over to [facility] and nobody had the courtesy to tell me there is a national shortage and they do not have it in stock; I need the script switched over to [facility] [address]  \"    "

## 2023-04-28 DIAGNOSIS — M79.7 FIBROMYALGIA: ICD-10-CM

## 2023-04-28 DIAGNOSIS — Q79.60 EHLERS-DANLOS SYNDROME: ICD-10-CM

## 2023-04-28 RX ORDER — TRAMADOL HYDROCHLORIDE 50 MG/1
100 TABLET ORAL EVERY 6 HOURS PRN
Qty: 240 TABLET | Refills: 0 | Status: SHIPPED | OUTPATIENT
Start: 2023-04-28 | End: 2023-05-25 | Stop reason: SDUPTHER

## 2023-05-24 DIAGNOSIS — M79.7 FIBROMYALGIA: ICD-10-CM

## 2023-05-24 DIAGNOSIS — Q79.60 EHLERS-DANLOS SYNDROME: ICD-10-CM

## 2023-05-24 RX ORDER — TRAMADOL HYDROCHLORIDE 50 MG/1
100 TABLET ORAL EVERY 6 HOURS PRN
Qty: 240 TABLET | Refills: 0 | Status: CANCELLED | OUTPATIENT
Start: 2023-05-24

## 2023-05-24 NOTE — TELEPHONE ENCOUNTER
Hello so I'm trying to leave a message about prescription refill  The voicemail message is really weird for prescription line but my name is Zena Oliveira  Birthday is [birth year]  My phone number [phone]  The medication is tramadol  TRAMADOL, 50 milligram tablets, take two tablets by mouth every six hours for a quantity of 240 tablets  The pharmacy is the [facility]  The [facility] pharmacy at stand that's impossible to read  I think [id number]  that's this is very short and supply and I called them, they said they have it  So that's why I'm asking for it there   Thank you

## 2023-05-25 DIAGNOSIS — M79.7 FIBROMYALGIA: ICD-10-CM

## 2023-05-25 DIAGNOSIS — Q79.60 EHLERS-DANLOS SYNDROME: ICD-10-CM

## 2023-05-25 RX ORDER — TRAMADOL HYDROCHLORIDE 50 MG/1
100 TABLET ORAL EVERY 6 HOURS PRN
Qty: 240 TABLET | Refills: 0 | Status: SHIPPED | OUTPATIENT
Start: 2023-05-26 | End: 2023-06-23 | Stop reason: SDUPTHER

## 2023-06-23 ENCOUNTER — APPOINTMENT (OUTPATIENT)
Dept: LAB | Facility: CLINIC | Age: 38
End: 2023-06-23
Payer: COMMERCIAL

## 2023-06-23 ENCOUNTER — OFFICE VISIT (OUTPATIENT)
Dept: FAMILY MEDICINE CLINIC | Facility: CLINIC | Age: 38
End: 2023-06-23
Payer: COMMERCIAL

## 2023-06-23 VITALS
BODY MASS INDEX: 22.9 KG/M2 | TEMPERATURE: 97.7 F | SYSTOLIC BLOOD PRESSURE: 136 MMHG | RESPIRATION RATE: 16 BRPM | HEART RATE: 82 BPM | WEIGHT: 160 LBS | HEIGHT: 70 IN | OXYGEN SATURATION: 98 % | DIASTOLIC BLOOD PRESSURE: 80 MMHG

## 2023-06-23 DIAGNOSIS — Z11.4 SCREENING FOR HIV (HUMAN IMMUNODEFICIENCY VIRUS): ICD-10-CM

## 2023-06-23 DIAGNOSIS — Z13.29 SCREENING FOR THYROID DISORDER: ICD-10-CM

## 2023-06-23 DIAGNOSIS — Z11.59 NEED FOR HEPATITIS C SCREENING TEST: ICD-10-CM

## 2023-06-23 DIAGNOSIS — M79.7 FIBROMYALGIA: ICD-10-CM

## 2023-06-23 DIAGNOSIS — Z13.0 SCREENING, ANEMIA, DEFICIENCY, IRON: ICD-10-CM

## 2023-06-23 DIAGNOSIS — Z13.220 SCREENING FOR LIPID DISORDERS: ICD-10-CM

## 2023-06-23 DIAGNOSIS — Z78.9 ALCOHOL USE: ICD-10-CM

## 2023-06-23 DIAGNOSIS — F11.20 CONTINUOUS OPIOID DEPENDENCE (HCC): ICD-10-CM

## 2023-06-23 DIAGNOSIS — G47.9 DIFFICULTY SLEEPING: ICD-10-CM

## 2023-06-23 DIAGNOSIS — Z13.1 SCREENING FOR DIABETES MELLITUS: ICD-10-CM

## 2023-06-23 DIAGNOSIS — Z72.0 TOBACCO USE: ICD-10-CM

## 2023-06-23 DIAGNOSIS — Q79.60 EHLERS-DANLOS SYNDROME: ICD-10-CM

## 2023-06-23 DIAGNOSIS — Q79.60 EHLERS-DANLOS SYNDROME: Primary | ICD-10-CM

## 2023-06-23 LAB
ALBUMIN SERPL BCP-MCNC: 4.3 G/DL (ref 3.5–5)
ALP SERPL-CCNC: 48 U/L (ref 46–116)
ALT SERPL W P-5'-P-CCNC: 45 U/L (ref 12–78)
ANION GAP SERPL CALCULATED.3IONS-SCNC: 2 MMOL/L
AST SERPL W P-5'-P-CCNC: 25 U/L (ref 5–45)
BASOPHILS # BLD AUTO: 0.01 THOUSANDS/ÂΜL (ref 0–0.1)
BASOPHILS NFR BLD AUTO: 0 % (ref 0–1)
BILIRUB SERPL-MCNC: 0.28 MG/DL (ref 0.2–1)
BUN SERPL-MCNC: 9 MG/DL (ref 5–25)
CALCIUM SERPL-MCNC: 9.2 MG/DL (ref 8.3–10.1)
CHLORIDE SERPL-SCNC: 106 MMOL/L (ref 96–108)
CHOLEST SERPL-MCNC: 214 MG/DL
CO2 SERPL-SCNC: 31 MMOL/L (ref 21–32)
CREAT SERPL-MCNC: 1.03 MG/DL (ref 0.6–1.3)
EOSINOPHIL # BLD AUTO: 0.12 THOUSAND/ÂΜL (ref 0–0.61)
EOSINOPHIL NFR BLD AUTO: 3 % (ref 0–6)
ERYTHROCYTE [DISTWIDTH] IN BLOOD BY AUTOMATED COUNT: 11.8 % (ref 11.6–15.1)
GFR SERPL CREATININE-BSD FRML MDRD: 93 ML/MIN/1.73SQ M
GLUCOSE P FAST SERPL-MCNC: 81 MG/DL (ref 65–99)
HCT VFR BLD AUTO: 38.7 % (ref 36.5–49.3)
HCV AB SER QL: NORMAL
HDLC SERPL-MCNC: 66 MG/DL
HGB BLD-MCNC: 13.1 G/DL (ref 12–17)
HIV 1+2 AB+HIV1 P24 AG SERPL QL IA: NORMAL
HIV 2 AB SERPL QL IA: NORMAL
HIV1 AB SERPL QL IA: NORMAL
HIV1 P24 AG SERPL QL IA: NORMAL
IMM GRANULOCYTES # BLD AUTO: 0 THOUSAND/UL (ref 0–0.2)
IMM GRANULOCYTES NFR BLD AUTO: 0 % (ref 0–2)
LDLC SERPL CALC-MCNC: 129 MG/DL (ref 0–100)
LYMPHOCYTES # BLD AUTO: 1.31 THOUSANDS/ÂΜL (ref 0.6–4.47)
LYMPHOCYTES NFR BLD AUTO: 28 % (ref 14–44)
MCH RBC QN AUTO: 32.1 PG (ref 26.8–34.3)
MCHC RBC AUTO-ENTMCNC: 33.9 G/DL (ref 31.4–37.4)
MCV RBC AUTO: 95 FL (ref 82–98)
MONOCYTES # BLD AUTO: 0.38 THOUSAND/ÂΜL (ref 0.17–1.22)
MONOCYTES NFR BLD AUTO: 8 % (ref 4–12)
NEUTROPHILS # BLD AUTO: 2.9 THOUSANDS/ÂΜL (ref 1.85–7.62)
NEUTS SEG NFR BLD AUTO: 61 % (ref 43–75)
NONHDLC SERPL-MCNC: 148 MG/DL
NRBC BLD AUTO-RTO: 0 /100 WBCS
PLATELET # BLD AUTO: 215 THOUSANDS/UL (ref 149–390)
PMV BLD AUTO: 10.3 FL (ref 8.9–12.7)
POTASSIUM SERPL-SCNC: 3.9 MMOL/L (ref 3.5–5.3)
PROT SERPL-MCNC: 6.9 G/DL (ref 6.4–8.4)
RBC # BLD AUTO: 4.08 MILLION/UL (ref 3.88–5.62)
SL AMB POCT HEMOGLOBIN AIC: 4.9 (ref ?–6.5)
SODIUM SERPL-SCNC: 139 MMOL/L (ref 135–147)
TRIGL SERPL-MCNC: 95 MG/DL
TSH SERPL DL<=0.05 MIU/L-ACNC: 4.23 UIU/ML (ref 0.45–4.5)
WBC # BLD AUTO: 4.72 THOUSAND/UL (ref 4.31–10.16)

## 2023-06-23 PROCEDURE — 87389 HIV-1 AG W/HIV-1&-2 AB AG IA: CPT

## 2023-06-23 PROCEDURE — 83036 HEMOGLOBIN GLYCOSYLATED A1C: CPT | Performed by: NURSE PRACTITIONER

## 2023-06-23 PROCEDURE — 80053 COMPREHEN METABOLIC PANEL: CPT

## 2023-06-23 PROCEDURE — 99214 OFFICE O/P EST MOD 30 MIN: CPT | Performed by: NURSE PRACTITIONER

## 2023-06-23 PROCEDURE — 86803 HEPATITIS C AB TEST: CPT

## 2023-06-23 PROCEDURE — 36415 COLL VENOUS BLD VENIPUNCTURE: CPT

## 2023-06-23 PROCEDURE — 84443 ASSAY THYROID STIM HORMONE: CPT

## 2023-06-23 PROCEDURE — 80061 LIPID PANEL: CPT

## 2023-06-23 PROCEDURE — 85025 COMPLETE CBC W/AUTO DIFF WBC: CPT

## 2023-06-23 RX ORDER — HYDROXYZINE HYDROCHLORIDE 25 MG/1
25 TABLET, FILM COATED ORAL
Qty: 30 TABLET | Refills: 2 | Status: SHIPPED | OUTPATIENT
Start: 2023-06-23 | End: 2023-06-23 | Stop reason: SDUPTHER

## 2023-06-23 RX ORDER — HYDROXYZINE HYDROCHLORIDE 25 MG/1
25 TABLET, FILM COATED ORAL
Qty: 30 TABLET | Refills: 2 | Status: SHIPPED | OUTPATIENT
Start: 2023-06-23

## 2023-06-23 RX ORDER — TRAMADOL HYDROCHLORIDE 50 MG/1
100 TABLET ORAL EVERY 6 HOURS PRN
Qty: 240 TABLET | Refills: 0 | Status: SHIPPED | OUTPATIENT
Start: 2023-06-23

## 2023-06-23 NOTE — PROGRESS NOTES
[facility]    ASSESSMENT AND PLAN     1  Starr-Danlos syndrome  Assessment & Plan:  Continues doing fairly well on current treatment  Ultram schedule: 150mg in AM-150mg at lunch-100mg at HS   Gabapentin as follows: 600 TID   Persistent intermittent joint pain-primarily in hands and wrists  Occasional back  Yeimi Carson continue current treatment plan  Contract remains in place     Years since any specialist follow up for his condition  Advise re-establish with Rheum to ensure we are doing all appropriate and current treatment  recommendations  Still has not scheduled  Discussed again the importance of eval - may be better, alternative treatment options  Echo and Aorta US still pending  Encouraged follow through with all recommendations    Orders:  -     CBC and differential; Future  -     Comprehensive metabolic panel; Future  -     US abdominal aorta screening aaa; Future; Expected date: 06/23/2023  -     Echo complete w/ contrast if indicated; Future; Expected date: 06/23/2023  -     traMADol (ULTRAM) 50 mg tablet; Take 2 tablets (100 mg total) by mouth every 6 (six) hours as needed for moderate pain    2  Fibromyalgia  -     CBC and differential; Future  -     Comprehensive metabolic panel; Future  -     traMADol (ULTRAM) 50 mg tablet; Take 2 tablets (100 mg total) by mouth every 6 (six) hours as needed for moderate pain    3  Screening for thyroid disorder  -     TSH, 3rd generation with Free T4 reflex; Future    4  Need for hepatitis C screening test  -     Hepatitis C antibody; Future    5  Screening for diabetes mellitus  -     Comprehensive metabolic panel; Future  -     POCT hemoglobin A1c    6  Screening for lipid disorders  -     Lipid panel; Future    7  Screening, anemia, deficiency, iron  -     CBC and differential; Future    8  Screening for HIV (human immunodeficiency virus)  -     : HIV 1/2 AB/AG w Reflex [facility] for 2 yr old and above; Future    9  Continuous opioid dependence ([facility] [address] )    10  "Difficulty sleeping  -     hydrOXYzine HCL (ATARAX) 25 mg tablet; Take 1 tablet (25 mg total) by mouth daily at bedtime as needed for anxiety (sleep)    11  Alcohol use  Assessment & Plan:  Has successfully quit alcohol  Reports no use in 6-8 weeks  Reports he is sleeping better but still with some intermittent difficulty  Still works a swing shift which is likely adding to the insomnia and poor overall sleep some nights  Will trial PRN Hydroxazine      12  Tobacco use  Assessment & Plan:  Remains tobacco free  Continued encouragement given       Routine lab work over due - stressed importance d/t chronic medication use  He will go downstairs today to complete  He did eat a few hours ago, but will adjust for that with lipid panel  I have concern of when he will be able to complete if we don't complete today while in office    SUBJECTIVE       Patient ID: Nathaneil Gaucher is a 41 y o  male  Chief Complaint   Patient presents with   • Follow-up     Med check    No labs       HISTORY OF PRESENT ILLNESS    Routine check up  No new concerns        The following portions of the patient's history were reviewed and updated as appropriate: allergies, current medications, past family history, past medical history, past social history, past surgical history, and problem list     REVIEW OF SYSTEMS  Review of Systems   Constitutional: Negative  Respiratory: Negative  Cardiovascular: Negative  Gastrointestinal: Negative  Genitourinary: Negative  Musculoskeletal:        Occasional symptoms as above   Neurological: Negative  Psychiatric/Behavioral: Positive for sleep disturbance  Negative for decreased concentration, dysphoric mood, self-injury and suicidal ideas  The patient is not nervous/anxious          OBJECTIVE      VITAL SIGNS  /80 (BP Location: Right arm, Patient Position: Sitting, Cuff Size: Large)   Pulse 82   Temp 97 7 °F (36 5 °C) (Temporal)   Resp 16   Ht 5' 9 5\" (1 765 m)   Wt 72 6 kg " (160 lb)   SpO2 98%   BMI 23 29 kg/m²     CURRENT MEDICATIONS    Current Outpatient Medications:   •  albuterol (Ventolin HFA) 90 mcg/act inhaler, Inhale 2 puffs as needed for wheezing, Disp: 18 g, Rfl: 1  •  aspirin-acetaminophen-caffeine (EXCEDRIN MIGRAINE) 250-250-65 MG per tablet, Take 3 tablets by mouth daily, Disp: , Rfl:   •  buPROPion (Wellbutrin SR) 100 mg 12 hr tablet, Take 1 tablet (100 mg total) by mouth 2 (two) times a day, Disp: 60 tablet, Rfl: 2  •  fluticasone (FLONASE) 50 mcg/act nasal spray, 1 spray into each nostril daily, Disp: 16 g, Rfl: 1  •  gabapentin (NEURONTIN) 100 mg capsule, Take 1 capsule (100 mg total) by mouth daily at bedtime, Disp: 30 capsule, Rfl: 5  •  gabapentin (NEURONTIN) 600 MG tablet, Take 1 tablet (600 mg total) by mouth 3 (three) times a day, Disp: 90 tablet, Rfl: 5  •  hydrOXYzine HCL (ATARAX) 25 mg tablet, Take 1 tablet (25 mg total) by mouth daily at bedtime as needed for anxiety (sleep), Disp: 30 tablet, Rfl: 2  •  traMADol (ULTRAM) 50 mg tablet, Take 2 tablets (100 mg total) by mouth every 6 (six) hours as needed for moderate pain, Disp: 240 tablet, Rfl: 0  •  triamcinolone (KENALOG) 0 1 % cream, Apply topically 2 (two) times a day, Disp: 30 g, Rfl: 1      PHYSICAL EXAMINATION   Physical Exam  Vitals and nursing note reviewed  Constitutional:       General: He is not in acute distress  Appearance: Normal appearance  He is well-developed and well-groomed  He is not ill-appearing  HENT:      Head: Normocephalic and atraumatic  Right Ear: Tympanic membrane, ear canal and external ear normal       Left Ear: Tympanic membrane, ear canal and external ear normal       Mouth/Throat:      Mouth: Mucous membranes are moist    Eyes:      Pupils: Pupils are equal, round, and reactive to light  Neck:      Vascular: No carotid bruit  Cardiovascular:      Rate and Rhythm: Normal rate and regular rhythm  Heart sounds: Normal heart sounds     Pulmonary: Effort: Pulmonary effort is normal  No respiratory distress  Breath sounds: Normal breath sounds and air entry  Lymphadenopathy:      Cervical: No cervical adenopathy  Skin:     General: Skin is warm and dry  Neurological:      General: No focal deficit present  Mental Status: He is alert and oriented to person, place, and time  Psychiatric:         Attention and Perception: Attention normal          Mood and Affect: Mood normal          Speech: Speech normal          Behavior: Behavior normal          Thought Content:  Thought content normal          Cognition and Memory: Cognition normal          Judgment: Judgment normal

## 2023-06-26 NOTE — ASSESSMENT & PLAN NOTE
Continues doing fairly well on current treatment  Ultram schedule: 150mg in AM-150mg at lunch-100mg at HS   Gabapentin as follows: 600 TID   Persistent intermittent joint pain-primarily in hands and wrists  Occasional back  Santatorres Zapata continue current treatment plan  Contract remains in place     Years since any specialist follow up for his condition  Advise re-establish with Rheum to ensure we are doing all appropriate and current treatment  recommendations  Still has not scheduled  Discussed again the importance of eval - may be better, alternative treatment options  Echo and Aorta US still pending    Encouraged follow through with all recommendations

## 2023-06-26 NOTE — ASSESSMENT & PLAN NOTE
Has successfully quit alcohol  Reports no use in 6-8 weeks  Reports he is sleeping better but still with some intermittent difficulty  Still works a swing shift which is likely adding to the insomnia and poor overall sleep some nights    Will trial PRN Hydroxazine

## 2023-07-27 DIAGNOSIS — M79.7 FIBROMYALGIA: ICD-10-CM

## 2023-07-27 DIAGNOSIS — Q79.60 EHLERS-DANLOS SYNDROME: ICD-10-CM

## 2023-07-27 RX ORDER — TRAMADOL HYDROCHLORIDE 50 MG/1
100 TABLET ORAL EVERY 6 HOURS PRN
Qty: 240 TABLET | Refills: 0 | Status: CANCELLED | OUTPATIENT
Start: 2023-07-27

## 2023-07-27 RX ORDER — TRAMADOL HYDROCHLORIDE 50 MG/1
100 TABLET ORAL EVERY 6 HOURS PRN
Qty: 240 TABLET | Refills: 0 | Status: SHIPPED | OUTPATIENT
Start: 2023-07-27 | End: 2023-08-25 | Stop reason: SDUPTHER

## 2023-08-02 DIAGNOSIS — Z72.0 TOBACCO USE: ICD-10-CM

## 2023-08-02 DIAGNOSIS — M79.7 FIBROMYALGIA: ICD-10-CM

## 2023-08-03 RX ORDER — GABAPENTIN 100 MG/1
100 CAPSULE ORAL
Qty: 30 CAPSULE | Refills: 1 | Status: SHIPPED | OUTPATIENT
Start: 2023-08-03

## 2023-08-03 RX ORDER — BUPROPION HYDROCHLORIDE 100 MG/1
100 TABLET, EXTENDED RELEASE ORAL 2 TIMES DAILY
Qty: 60 TABLET | Refills: 0 | OUTPATIENT
Start: 2023-08-03

## 2023-08-03 NOTE — TELEPHONE ENCOUNTER
Call pt. I see he requested a Wellbutrin refill. Does not look like that was refilled since December last year. I thought he stopped taking it. Please confirm - did he restart?  If so, why?

## 2023-08-14 DIAGNOSIS — M79.7 FIBROMYALGIA: ICD-10-CM

## 2023-08-15 RX ORDER — GABAPENTIN 600 MG/1
600 TABLET ORAL 3 TIMES DAILY
Qty: 90 TABLET | Refills: 0 | OUTPATIENT
Start: 2023-08-15

## 2023-08-16 DIAGNOSIS — M79.7 FIBROMYALGIA: ICD-10-CM

## 2023-08-16 RX ORDER — GABAPENTIN 600 MG/1
600 TABLET ORAL 3 TIMES DAILY
Qty: 90 TABLET | Refills: 5 | Status: SHIPPED | OUTPATIENT
Start: 2023-08-16

## 2023-08-24 DIAGNOSIS — M79.7 FIBROMYALGIA: ICD-10-CM

## 2023-08-24 DIAGNOSIS — Q79.60 EHLERS-DANLOS SYNDROME: ICD-10-CM

## 2023-08-24 RX ORDER — TRAMADOL HYDROCHLORIDE 50 MG/1
100 TABLET ORAL EVERY 6 HOURS PRN
Qty: 240 TABLET | Refills: 0 | Status: CANCELLED | OUTPATIENT
Start: 2023-08-24

## 2023-08-25 DIAGNOSIS — Q79.60 EHLERS-DANLOS SYNDROME: ICD-10-CM

## 2023-08-25 DIAGNOSIS — M79.7 FIBROMYALGIA: ICD-10-CM

## 2023-08-25 RX ORDER — TRAMADOL HYDROCHLORIDE 50 MG/1
100 TABLET ORAL EVERY 6 HOURS PRN
Qty: 240 TABLET | Refills: 0 | Status: SHIPPED | OUTPATIENT
Start: 2023-08-25

## 2023-09-07 ENCOUNTER — HOSPITAL ENCOUNTER (OUTPATIENT)
Dept: NON INVASIVE DIAGNOSTICS | Facility: HOSPITAL | Age: 38
Discharge: HOME | End: 2023-09-07
Payer: COMMERCIAL

## 2023-09-07 VITALS
HEIGHT: 70 IN | WEIGHT: 160 LBS | SYSTOLIC BLOOD PRESSURE: 136 MMHG | HEART RATE: 82 BPM | BODY MASS INDEX: 22.9 KG/M2 | DIASTOLIC BLOOD PRESSURE: 80 MMHG

## 2023-09-07 DIAGNOSIS — Q79.60 EHLERS-DANLOS SYNDROME: ICD-10-CM

## 2023-09-07 LAB
AORTIC ROOT: 3.4 CM
AORTIC VALVE MEAN VELOCITY: 8.6 M/S
APICAL FOUR CHAMBER EJECTION FRACTION: 54 %
ASCENDING AORTA: 2.9 CM
AV LVOT MEAN GRADIENT: 2 MMHG
AV LVOT PEAK GRADIENT: 4 MMHG
AV MEAN GRADIENT: 3 MMHG
AV PEAK GRADIENT: 6 MMHG
AV VELOCITY RATIO: 0.76
DOP CALC AO PEAK VEL: 1.26 M/S
DOP CALC AO VTI: 23.79 CM
DOP CALC LVOT PEAK VEL VTI: 18.35 CM
DOP CALC LVOT PEAK VEL: 0.96 M/S
E WAVE DECELERATION TIME: 225 MS
FRACTIONAL SHORTENING: 35 (ref 28–44)
INTERVENTRICULAR SEPTUM IN DIASTOLE (PARASTERNAL SHORT AXIS VIEW): 0.7 CM
INTERVENTRICULAR SEPTUM: 0.7 CM (ref 0.6–1.1)
LAAS-AP2: 13 CM2
LAAS-AP4: 15.3 CM2
LEFT ATRIUM SIZE: 3.2 CM
LEFT ATRIUM VOLUME (MOD BIPLANE): 39 ML
LEFT INTERNAL DIMENSION IN SYSTOLE: 3.3 CM (ref 2.1–4)
LEFT VENTRICULAR INTERNAL DIMENSION IN DIASTOLE: 5.1 CM (ref 3.5–6)
LEFT VENTRICULAR POSTERIOR WALL IN END DIASTOLE: 0.7 CM
LEFT VENTRICULAR STROKE VOLUME: 82 ML
LVSV (TEICH): 82 ML
MV E'TISSUE VEL-SEP: 15 CM/S
MV PEAK A VEL: 0.55 M/S
MV PEAK E VEL: 68 CM/S
MV STENOSIS PRESSURE HALF TIME: 65 MS
MV VALVE AREA P 1/2 METHOD: 3.38
RIGHT VENTRICLE ID DIMENSION: 3.6 CM
SL CV LEFT ATRIUM LENGTH A2C: 3.8 CM
SL CV LV EF: 55
SL CV PED ECHO LEFT VENTRICLE DIASTOLIC VOLUME (MOD BIPLANE) 2D: 125 ML
SL CV PED ECHO LEFT VENTRICLE SYSTOLIC VOLUME (MOD BIPLANE) 2D: 44 ML
TR MAX PG: 14 MMHG
TR PEAK VELOCITY: 1.9 M/S
TRICUSPID ANNULAR PLANE SYSTOLIC EXCURSION: 2.1 CM
TRICUSPID VALVE PEAK REGURGITATION VELOCITY: 1.89 M/S

## 2023-09-07 PROCEDURE — 93306 TTE W/DOPPLER COMPLETE: CPT

## 2023-09-14 DIAGNOSIS — Z72.0 TOBACCO USE: ICD-10-CM

## 2023-09-14 RX ORDER — BUPROPION HYDROCHLORIDE 100 MG/1
100 TABLET, EXTENDED RELEASE ORAL 2 TIMES DAILY
Qty: 60 TABLET | Refills: 0 | Status: SHIPPED | OUTPATIENT
Start: 2023-09-14

## 2023-09-25 DIAGNOSIS — M79.7 FIBROMYALGIA: ICD-10-CM

## 2023-09-25 DIAGNOSIS — Q79.60 EHLERS-DANLOS SYNDROME: ICD-10-CM

## 2023-09-25 RX ORDER — TRAMADOL HYDROCHLORIDE 50 MG/1
100 TABLET ORAL EVERY 6 HOURS PRN
Qty: 240 TABLET | Refills: 0 | Status: SHIPPED | OUTPATIENT
Start: 2023-09-25 | End: 2023-10-24 | Stop reason: SDUPTHER

## 2023-09-25 RX ORDER — TRAMADOL HYDROCHLORIDE 50 MG/1
100 TABLET ORAL EVERY 6 HOURS PRN
Qty: 240 TABLET | Refills: 0 | Status: CANCELLED | OUTPATIENT
Start: 2023-09-25

## 2023-09-27 DIAGNOSIS — G47.9 DIFFICULTY SLEEPING: ICD-10-CM

## 2023-09-27 RX ORDER — HYDROXYZINE HYDROCHLORIDE 25 MG/1
25 TABLET, FILM COATED ORAL
Qty: 30 TABLET | Refills: 2 | Status: SHIPPED | OUTPATIENT
Start: 2023-09-27

## 2023-09-29 ENCOUNTER — OFFICE VISIT (OUTPATIENT)
Dept: FAMILY MEDICINE CLINIC | Facility: CLINIC | Age: 38
End: 2023-09-29
Payer: COMMERCIAL

## 2023-09-29 VITALS
SYSTOLIC BLOOD PRESSURE: 144 MMHG | OXYGEN SATURATION: 99 % | BODY MASS INDEX: 23.31 KG/M2 | TEMPERATURE: 98.9 F | HEART RATE: 87 BPM | HEIGHT: 70 IN | DIASTOLIC BLOOD PRESSURE: 84 MMHG | WEIGHT: 162.8 LBS

## 2023-09-29 DIAGNOSIS — F11.20 CONTINUOUS OPIOID DEPENDENCE (HCC): ICD-10-CM

## 2023-09-29 DIAGNOSIS — Q79.60 EHLERS-DANLOS SYNDROME: Primary | ICD-10-CM

## 2023-09-29 DIAGNOSIS — M79.7 FIBROMYALGIA: ICD-10-CM

## 2023-09-29 PROCEDURE — 99215 OFFICE O/P EST HI 40 MIN: CPT | Performed by: NURSE PRACTITIONER

## 2023-09-29 RX ORDER — NALOXONE HYDROCHLORIDE 4 MG/.1ML
SPRAY NASAL
Qty: 1 EACH | Refills: 1 | Status: SHIPPED | OUTPATIENT
Start: 2023-09-29 | End: 2024-09-28

## 2023-09-29 NOTE — PATIENT INSTRUCTIONS
Goals of care:  Maximize your health and quality of life by:   · Increasing your level of function and activity  · Decreasing the negative effects of pain on your life  · Minimizing the risks and side effects of medications and ensuring safe use of opioid medication     Ways for you to help meet your goals:  Maintain a healthy lifestyle. This includes proper nutrition, regular physical activity as able, try for 8 hours of sleep per night, use stress reduction strategies, avoid triggers. Risks and side effects of opioid use:  Prescription opioids carry serious risks of addiction and  overdose, especially with prolonged use. An opioid overdose,  often marked by slowed breathing, can cause sudden death. The  use of prescription opioids can have a number of side effects as  well, even when taken as directed:  · Tolerance--meaning you might need to take more of a medication for the same pain relief  · Physical dependence--meaning you have symptoms of withdrawal when a medication is stopped  · Increased sensitivity to pain  · Constipation  · Nausea, vomiting, dry mouth  · Sleepiness and dizziness   · Confusion  · Depression  · Low levels of testosterone that can result in lower sex drive, energy, and strength  · Itching and sweating    If you are prescribed opioids for pain:  · Never take opioids in greater amounts or more often than prescribed. · Help prevent misuse and abuse. - Never sell or share prescription opioids.        - Never use another person’s prescription opioids. · ‡Store prescription opioids in a secure place and out of reach of others (this may include visitors, children, friends, and family). · Safely dispose of unused prescription opioids: Find your community drug take-back program or your pharmacy mail-back program, or flush them down the toilet, following guidance from the Food and Drug Administration (www.fda.gov/Drugs/ResourcesForYou).   · ‡Visit www.cdc.gov/drugoverdose to learn about the risks of opioid abuse and overdose. · If you believe you may be struggling with addiction, tell your health care provider and ask for guidance or call [facility]’s National Helpline at 8-834-722-GBVZ.

## 2023-09-29 NOTE — ASSESSMENT & PLAN NOTE
Current Ultram schedule: 150mg in AM-150mg at lunch-100mg at HS.  Gabapentin as follows: 600 TID.  Persistent intermittent joint pain-primarily in hands, wrists and knees. Occasional back.

## 2023-09-29 NOTE — PROGRESS NOTES
Assessment/Plan     Problem List Items Addressed This Visit        Musculoskeletal and Integument    Starr-Danlos syndrome - Primary     Current Ultram schedule: 150mg in AM-150mg at lunch-100mg at HS.  Gabapentin as follows: 600 TID.  Persistent intermittent joint pain-primarily in hands, wrists and knees. Occasional back.          Relevant Orders    [facility] All Prescribed Meds    Amphetamine    Alprazolam    Clonazepam    Diazepam    Lorazepam    Oxazepam    Temazepam    Gabapentin    Pregabalin    Cocaine    Heroin    Buprenorphine    Levorphanol    Meperidine    Naltrexone    Fentanyl    Methadone    Oxycodone    Oxymorphone    Tapentadol    Tramadol    THC    Codeine, Hydrocodone, Hydromorphone, Morphine    Methylphenidate       Other    Fibromyalgia    Relevant Orders    [facility] All Prescribed Meds    Amphetamine    Alprazolam    Clonazepam    Diazepam    Lorazepam    Oxazepam    Temazepam    Gabapentin    Pregabalin    Cocaine    Heroin    Buprenorphine    Levorphanol    Meperidine    Naltrexone    Fentanyl    Methadone    Oxycodone    Oxymorphone    Tapentadol    Tramadol    THC    Codeine, Hydrocodone, Hydromorphone, Morphine    Methylphenidate    Continuous opioid dependence (HCC)    Relevant Medications    naloxone (NARCAN) 4 mg/0.1 mL nasal spray    Other Relevant Orders    [facility] All Prescribed Meds    Amphetamine    Alprazolam    Clonazepam    Diazepam    Lorazepam    Oxazepam    Temazepam    Gabapentin    Pregabalin    Cocaine    Heroin    Buprenorphine    Levorphanol    Meperidine    Naltrexone    Fentanyl    Methadone    Oxycodone    Oxymorphone    Tapentadol    Tramadol    THC    Codeine, Hydrocodone, Hydromorphone, Morphine    Methylphenidate      Treatment Plan: Refill just sent 9/25 for his usual 240 pills  Discussed likelihood of positive drug screen for alcohol and marijuana  Discussed this is breach of contract  Discussed will not abruptly stop his medication               High dose - concern for withdrawal  Discussed will start weaning                 Wean by 10%  Next refill 10/25 will be 216 pills only    Treatment Goals: Discussed importance of pt stopping both alcohol and marijuana                     For both immediate and long term health concerns/reasons  He reports he may look into medical marijuana card as we discussed at prior visits  Offered pt appointment in one month to recheck urine - he currently declines    Opiate risks  There are risks associated with opioid medications, including dependence, addiction and tolerance. The patient understands and agrees to use these medications only as prescribed. Potential side effects of the medications include, but are not limited to, constipation, drowsiness, addiction, impaired judgment and risk of fatal overdose if not taken as prescribed. The patient was warned against driving while taking sedation medications. Sharing medications is a felony. At this point in time, the patient is showing no signs of addiction, abuse, diversion or suicidal ideation. Opioid agreement  Pain management agreement was reviewed with patient and signed/updated during visit      Drug screen  Drug screen collected during today's visit      PDMP review  [place] or [place]  reviewed. No red flags were identified; safe to proceed with prescription      I have spent a total time of 50 minutes on the day of the encounter caring for this patient including discussing diagnostic results, risks and benefits of treatment options, patient and family education, importance of treatment compliance, risk factor reductions, documenting in the medical record and reviewing/ordering tests, medicine, procedures. Extensive time spent today discussing likely positive results of pt drug screen testing today.   We had a lengthy discussion again today regarding significant risks of using non prescribed medications and alcohol with his current treatment  Current Narcan rx sent  He is aware that by using these other substances, that he is violation of his contract. This has again been discussed in detail  He has been given ample time to quit alcohol  He has been given referral in past for therapy and for addition  He has not followed through as Talha Husbands    He has declined one month follow up here in the office to assess progression  Next dose due of Tramadol will be weaned by 10% - to avoid unpleasant/danagerous withdrawal symptoms  Will continue with increasingly higher percentage decreases until wean completed  Pt is aware of these conditions    Pt is admittedly angry today with results of visit. Has concern his pain will not be well managed on a decreased Tramadol dose  He was again offered a one month follow up in office - he continued to decline        Subjective     Opioid Management:   Type of visit: Follow-up    Pain related diagnoses: Starr-Danlos Syndrome    Interval history: Pain fairly well managed with tramadol and gabapentin. Schedule as below:  Ultram schedule: 150mg in AM-150mg at lunch-100mg at HS. Gabapentin schedule: 600 TID.      Referral has been made to Rheumatology  Appt scheduled for 10/31    Aberrant behavior?: No      Adverse effects from medication?: No      Screening Tools/Assessments:    PHQ-2/9:  Last PHQ-2 score: 0 (Last PHQ-2 date: 8/12/2022)      Old records  Old records received and reviewed from: last visit 6/2023    Drug Screen:      Drug screen comments: Last test was positive for Ultram, gabapentin and alcohol  Patient was counseled on alcohol cessation   Discussed details of contract  Was educated on immediate and long-term risks  Reported he would quit drinking, as he was just drinking recreationally (but did admit that time he was drinking in excess )  Reported he completely quit drinking at his last visit June 2023  Due for drug screen today  Pt reports unable to give urine today  Oral swab taken  He admits that his test will be positive for alcohol and marijuana in addition to his Tramadol and Gabapentin  Denies any other prescription or non prescription drug use    Opioid agreement:  Active Opioid agreement on file?: No    Opioid agreement signed date: 8/12/2022  Opioid agreement expiration date: 8/12/2023    Naloxone:  Currently prescribed Naloxone (Narcan): No      High risk medications  High risk meds taken in last 72 hours: Tramadol    Referrals/imaging/procedures since last visit  ECHO - completed  Aortic US - order still pending  Referral to Rheumatology - scheduled for 10/31    Opioid  management appointment today. Patient is due for repeat urine screen. Declines to give urine today, stating he cannot urinate. Oral swab specimen collected. Patient admits that his swabbing will likely be positive for both marijuana and alcohol. He reported stopping alcohol at his last visit in June, but admits to resuming use. States he has been drinking every day for the last couple days again. Reports he drinks 3-4 beers per night. He also admits to smoking marijuana nightly. States it helps him relax and sleep. States he gets it from a friend who gets it from a dispensary. Uses a vape form. He does not have a medical marijuana card himself. He does admit that he uses alcohol and marijuana for recreational purposes. Reports it does not help his pain. But the marijuana at night does help his anxiety and allows him to sleep better. Otherwise he has no new concerns.     Pain Medications             aspirin-acetaminophen-caffeine (EXCEDRIN MIGRAINE) 250-250-65 MG per tablet Take 3 tablets by mouth daily    buPROPion (Wellbutrin SR) 100 mg 12 hr tablet Take 1 tablet (100 mg total) by mouth 2 (two) times a day    gabapentin (NEURONTIN) 100 mg capsule Take 1 capsule (100 mg total) by mouth daily at bedtime    gabapentin (NEURONTIN) 600 MG tablet Take 1 tablet (600 mg total) by mouth 3 (three) times a day    traMADol (ULTRAM) 50 mg tablet Take 2 tablets (100 mg total) by mouth every 6 (six) hours as needed for moderate pain         Outpatient Morphine Milligram Equivalents Per Day     9/29/23 and after 40 MME/Day    Order Name Dose Route Frequency Maximum MME/Day     traMADol (ULTRAM) 50 mg tablet 100 mg Oral Every 6 hours PRN 40 MME/Day    Total Potential Morphine Milligram Equivalents Per Day 40 MME/Day    Calculation Information        traMADol (ULTRAM) 50 mg tablet    traMADol 50 mg Tabs: single dose of 100 mg * 4 doses per day * morphine equivalence factor of 0.1 = 40 MME/Day                         PDMP Review       Value Time User    PDMP Reviewed  Yes 9/25/2023  5:05 PM JERRICA Arreguin         Review of Systems   Constitutional: Negative for activity change and appetite change. Respiratory: Negative. Cardiovascular: Negative. Musculoskeletal: Positive for arthralgias, back pain, myalgias and neck pain. Neurological: Negative. Psychiatric/Behavioral: Positive for sleep disturbance. Negative for self-injury and suicidal ideas. The patient is nervous/anxious. Objective     /84 (BP Location: Right arm, Patient Position: Sitting, Cuff Size: Adult)   Pulse 87   Temp 98.9 °F (37.2 °C)   Ht 5' 10" (1.778 m)   Wt 73.8 kg (162 lb 12.8 oz)   SpO2 99%   BMI 23.36 kg/m²     Physical Exam  Vitals and nursing note reviewed. Constitutional:       General: He is not in acute distress. Appearance: Normal appearance. He is well-developed. He is not ill-appearing. Cardiovascular:      Rate and Rhythm: Normal rate and regular rhythm. Pulmonary:      Effort: Pulmonary effort is normal. No respiratory distress. Breath sounds: Normal breath sounds and air entry. Neurological:      Mental Status: He is alert and oriented to person, place, and time. Psychiatric:         Attention and Perception: Attention normal.         Mood and Affect: Mood normal.         Behavior: Behavior normal.         Thought Content:  Thought content normal.         Judgment: Judgment normal.         Diogenes Chill, CRNP

## 2023-10-04 LAB
7AMINOCLONAZEPAM SAL QL CFM: NEGATIVE NG/ML
AMPHET SAL QL CFM: NEGATIVE NG/ML
BUPRENORPHINE SAL QL SCN: NEGATIVE NG/ML
CARBOXYTHC SAL QL CFM: ABNORMAL NG/ML
CCP IGG SERPL-ACNC: NEGATIVE
COCAINE SAL QL CFM: NEGATIVE NG/ML
CODEINE SAL QL CFM: NEGATIVE NG/ML
DXO+LEVORPHANOL SAL QL CFM: NEGATIVE NG/ML
EDDP SAL QL CFM: NEGATIVE NG/ML
GABAPENTIN SAL QL CFM: NORMAL NG/ML
HYDROCODONE SAL QL CFM: NEGATIVE NG/ML
LEUKEMIA MARKERS BLD-IMP: NEGATIVE NG/ML
M PROTEIN 3 UR ELPH-MCNC: NEGATIVE NG/ML
M TB TUBERC IGNF/MITOGEN IGNF CONTROL: NEGATIVE NG/ML
METHADONE SAL QL CFM: NEGATIVE NG/ML
MORPHINE SAL QL CFM: NEGATIVE NG/ML
NALTREXOL SAL QL CFM: NEGATIVE NG/ML
NALTREXONE SAL QL CFM: NEGATIVE NG/ML
OXYMORPHONE SAL QL CFM: NEGATIVE NG/ML
OXYMORPHONE SAL QL CFM: NEGATIVE NG/ML
PREGABALIN SAL QL CFM: NEGATIVE NG/ML
RESULT ALL_PRESCRIBED MEDS AND SPECIAL INSTRUCTIONS: NORMAL
SL AMB 6-MAM (HEROIN METABOLITE) QUANTIFICATION: NEGATIVE NG/ML
SL AMB ALPRAZOLAM QUANTIFICATION: NEGATIVE NG/ML
SL AMB CLONAZEPAM QUANTIFICATION: NEGATIVE NG/ML
SL AMB DIAZEPAM QUANTIFICATION: NEGATIVE NG/ML
SL AMB FENTANYL QUANTIFICATION: NEGATIVE NG/ML
SL AMB N-DESMETHYL-TRAMADOL QUANTIFICATION SALIVA: NORMAL NG/ML
SL AMB NORBUPRENORPHINE QUANTIFICATION: NEGATIVE NG/ML
SL AMB NORDIAZEPAM QUANTIFICATION: NEGATIVE NG/ML
SL AMB NORFENTANYL QUANTIFICATION: NEGATIVE NG/ML
SL AMB NORHYDROCODONE QUANTIFICATION: NEGATIVE NG/ML
SL AMB NORMEPERIDINE QUANTIFICATION: NEGATIVE NG/ML
SL AMB NOROXYCODONE QUANTIFICATION: NEGATIVE NG/ML
SL AMB OXAZEPAM QUANTIFICATION: NEGATIVE NG/ML
SL AMB RITALINIC ACID QUANTIFICATION: NEGATIVE
SL AMB TEMAZEPAM QUANTIFICATION: NEGATIVE NG/ML
SL AMB TEMAZEPAM QUANTIFICATION: NEGATIVE NG/ML
SL AMB TRAMADOL QUANTIFICATION: NORMAL NG/ML
SQUAMOUS #/AREA URNS HPF: NEGATIVE NG/ML
TAPENTADOL SAL QL CFM: NEGATIVE NG/ML

## 2023-10-19 DIAGNOSIS — Z72.0 TOBACCO USE: ICD-10-CM

## 2023-10-19 DIAGNOSIS — M79.7 FIBROMYALGIA: ICD-10-CM

## 2023-10-19 RX ORDER — BUPROPION HYDROCHLORIDE 100 MG/1
100 TABLET, EXTENDED RELEASE ORAL 2 TIMES DAILY
Qty: 60 TABLET | Refills: 0 | Status: SHIPPED | OUTPATIENT
Start: 2023-10-19

## 2023-10-19 RX ORDER — GABAPENTIN 100 MG/1
100 CAPSULE ORAL
Qty: 30 CAPSULE | Refills: 1 | Status: SHIPPED | OUTPATIENT
Start: 2023-10-19

## 2023-10-24 DIAGNOSIS — M79.7 FIBROMYALGIA: ICD-10-CM

## 2023-10-24 DIAGNOSIS — Q79.60 EHLERS-DANLOS SYNDROME: ICD-10-CM

## 2023-10-24 RX ORDER — TRAMADOL HYDROCHLORIDE 50 MG/1
100 TABLET ORAL EVERY 6 HOURS PRN
Qty: 216 TABLET | Refills: 0 | Status: SHIPPED | OUTPATIENT
Start: 2023-10-24 | End: 2023-11-26 | Stop reason: SDUPTHER

## 2023-10-24 RX ORDER — TRAMADOL HYDROCHLORIDE 50 MG/1
100 TABLET ORAL EVERY 6 HOURS PRN
Qty: 240 TABLET | Refills: 0 | Status: CANCELLED | OUTPATIENT
Start: 2023-10-24

## 2023-10-30 ENCOUNTER — TELEPHONE (OUTPATIENT)
Dept: RHEUMATOLOGY | Facility: CLINIC | Age: 38
End: 2023-10-30

## 2023-10-30 DIAGNOSIS — Q79.60 EHLERS-DANLOS SYNDROME: Primary | ICD-10-CM

## 2023-10-30 NOTE — TELEPHONE ENCOUNTER
Pls call pt and advise that his Rheumatology appt has been canceled. Pain management was recommended - I placed the referral for him.

## 2023-10-30 NOTE — TELEPHONE ENCOUNTER
Discussed referral with Ms. James Pathak is not a rheumatologic disease and I do not have anything to offer him for this disease. Discussed that he would be better served by primary care and possible genetics and pain medicine referrals if needed. MsBalaji Ludy Cyn asked that we cancel his appointment with us, and expressed understanding and all questions were answered to her satisfaction. Please cancel patient's appointment with me tomorrow. Ms. Torie Mcarthur office should make patient aware.

## 2023-10-31 NOTE — TELEPHONE ENCOUNTER
LVM and my chart message to notify pt of cancellation and provide details for spine and pain management referral

## 2023-11-10 ENCOUNTER — TELEPHONE (OUTPATIENT)
Dept: PAIN MEDICINE | Facility: CLINIC | Age: 38
End: 2023-11-10

## 2023-11-10 NOTE — TELEPHONE ENCOUNTER
Scheduled patient Consult with Dr. Genevieve Lopez at [facility] 11/24 10:30 am Friday unable to put in please add 30 min consult and assign referral asap     Thank you

## 2023-11-17 ENCOUNTER — TELEPHONE (OUTPATIENT)
Dept: OBGYN CLINIC | Facility: MEDICAL CENTER | Age: 38
End: 2023-11-17

## 2023-11-17 NOTE — TELEPHONE ENCOUNTER
LVM to let pt know that his appointment with Dr Herminia Lemos was cancelled because per the doctor she does not have anything to offer at this time

## 2023-11-17 NOTE — TELEPHONE ENCOUNTER
----- Message from Kylah Silverman MD sent at 2023 10:49 AM EST -----  [facility] appointment  Based on chart review, unfortunately, I do not have any interventional treatments to offer patient at this time. Please cancel this visit.

## 2023-11-21 ENCOUNTER — TELEPHONE (OUTPATIENT)
Age: 38
End: 2023-11-21

## 2023-11-21 DIAGNOSIS — Z72.0 TOBACCO USE: ICD-10-CM

## 2023-11-21 RX ORDER — BUPROPION HYDROCHLORIDE 100 MG/1
100 TABLET, EXTENDED RELEASE ORAL 2 TIMES DAILY
Qty: 60 TABLET | Refills: 2 | Status: SHIPPED | OUTPATIENT
Start: 2023-11-21

## 2023-11-21 NOTE — TELEPHONE ENCOUNTER
Caller: pt Rosita Saldana    Doctor: Diana Arevalo    Reason for call: pt returning call, I advised pt per notes that Dr. Diana Arevalo has no interventionally treatment to help pt at this time.  Pt understood    Call back#: [phone]

## 2023-11-21 NOTE — TELEPHONE ENCOUNTER
Caller: Patient    Doctor/Office: [facility]    Call regarding :  returning call    Call was transferred to: [facility]

## 2023-11-24 DIAGNOSIS — M79.7 FIBROMYALGIA: ICD-10-CM

## 2023-11-24 DIAGNOSIS — Q79.60 EHLERS-DANLOS SYNDROME: ICD-10-CM

## 2023-11-24 RX ORDER — TRAMADOL HYDROCHLORIDE 50 MG/1
100 TABLET ORAL EVERY 6 HOURS PRN
Qty: 216 TABLET | Refills: 0 | Status: CANCELLED | OUTPATIENT
Start: 2023-11-24

## 2023-11-24 NOTE — TELEPHONE ENCOUNTER
Reji, my name is William Joiner. Birth date [birth year] Phone number [phone]. Medication is tramadol. TRAMADOL 15 milligram tablets Take two tablets by mouth every six hours for a quantity of 240 tablets. The pharmacy is the [facility] in [facility]. [address] Thank you. You received a voice mail from [facility].

## 2023-11-26 DIAGNOSIS — M79.7 FIBROMYALGIA: ICD-10-CM

## 2023-11-26 DIAGNOSIS — Q79.60 EHLERS-DANLOS SYNDROME: ICD-10-CM

## 2023-11-26 RX ORDER — TRAMADOL HYDROCHLORIDE 50 MG/1
100 TABLET ORAL EVERY 6 HOURS PRN
Qty: 216 TABLET | Refills: 0 | Status: SHIPPED | OUTPATIENT
Start: 2023-11-26

## 2023-11-26 NOTE — TELEPHONE ENCOUNTER
Please call patient. Reduced amount of tramadol sent to pharmacy today-as we discussed at his last visit. Please advise he needs to schedule for repeat urine screen.   If not, unfortunately we will have to continue decreasing his tramadol-as he is non compliant with his contract

## 2023-11-28 ENCOUNTER — TELEPHONE (OUTPATIENT)
Dept: FAMILY MEDICINE CLINIC | Facility: CLINIC | Age: 38
End: 2023-11-28

## 2023-11-28 NOTE — TELEPHONE ENCOUNTER
Patient called stating [facility] pharmacy is requesting authorization for his tramadol. He said this has never been needed before. Patient made aware it will be verified .

## 2023-11-28 NOTE — TELEPHONE ENCOUNTER
Spoke with Emily at [facility] to started prior auth for Tramadol approved 11/28/23-5/26/24 ref # [id number]

## 2023-12-29 ENCOUNTER — TELEPHONE (OUTPATIENT)
Dept: FAMILY MEDICINE CLINIC | Facility: CLINIC | Age: 38
End: 2023-12-29

## 2023-12-29 ENCOUNTER — OFFICE VISIT (OUTPATIENT)
Dept: FAMILY MEDICINE CLINIC | Facility: CLINIC | Age: 38
End: 2023-12-29
Payer: COMMERCIAL

## 2023-12-29 VITALS
TEMPERATURE: 97.6 F | HEART RATE: 81 BPM | HEIGHT: 70 IN | DIASTOLIC BLOOD PRESSURE: 82 MMHG | SYSTOLIC BLOOD PRESSURE: 130 MMHG | BODY MASS INDEX: 23.62 KG/M2 | WEIGHT: 165 LBS | OXYGEN SATURATION: 99 %

## 2023-12-29 DIAGNOSIS — Q79.60 EHLERS-DANLOS SYNDROME: ICD-10-CM

## 2023-12-29 DIAGNOSIS — M79.7 FIBROMYALGIA: ICD-10-CM

## 2023-12-29 DIAGNOSIS — F11.20 CONTINUOUS OPIOID DEPENDENCE (HCC): Primary | ICD-10-CM

## 2023-12-29 PROCEDURE — 99214 OFFICE O/P EST MOD 30 MIN: CPT

## 2023-12-29 RX ORDER — TRAMADOL HYDROCHLORIDE 50 MG/1
100 TABLET ORAL EVERY 6 HOURS PRN
Qty: 112 TABLET | Refills: 0 | Status: SHIPPED | OUTPATIENT
Start: 2023-12-29 | End: 2024-01-12

## 2023-12-29 RX ORDER — GABAPENTIN 100 MG/1
100 CAPSULE ORAL
Qty: 30 CAPSULE | Refills: 1 | Status: SHIPPED | OUTPATIENT
Start: 2023-12-29

## 2023-12-29 NOTE — PATIENT INSTRUCTIONS
Goals of care:  Maximize your health and quality of life by:   Increasing your level of function and activity  Decreasing the negative effects of pain on your life  Minimizing the risks and side effects of medications and ensuring safe use of opioid medication     Ways for you to help meet your goals:  Maintain a healthy lifestyle. This includes proper nutrition, regular physical activity as able, try for 8 hours of sleep per night, use stress reduction strategies, avoid triggers.      Risks and side effects of opioid use:  Prescription opioids carry serious risks of addiction and  overdose, especially with prolonged use. An opioid overdose,  often marked by slowed breathing, can cause sudden death. The  use of prescription opioids can have a number of side effects as  well, even when taken as directed:  Tolerance--meaning you might need to take more of a medication for the same pain relief  Physical dependence--meaning you have symptoms of withdrawal when a medication is stopped  Increased sensitivity to pain  Constipation  Nausea, vomiting, dry mouth  Sleepiness and dizziness   Confusion  Depression  Low levels of testosterone that can result in lower sex drive, energy, and strength  Itching and sweating    If you are prescribed opioids for pain:  Never take opioids in greater amounts or more often than prescribed.  Help prevent misuse and abuse.        - Never sell or share prescription opioids.        - Never use another person’s prescription opioids.  ‡Store prescription opioids in a secure place and out of reach of others (this may include visitors, children, friends, and family).  Safely dispose of unused prescription opioids: Find your community drug take-back program or your pharmacy mail-back program, or flush them down the toilet, following guidance from the Food and Drug Administration (www.fda.gov/Drugs/ResourcesForYou).  ‡Visit www.cdc.gov/drugoverdose to learn about the risks of opioid abuse and  overdose.  If you believe you may be struggling with addiction, tell your health care provider and ask for guidance or call [facility]’s National Helpline at 8-871-755-HDRA.

## 2023-12-29 NOTE — PROGRESS NOTES
Name: Parviz Hernandez      : 1985      MRN: [id number]  Encounter Provider: Kaitlynn Deleon PA-C  Encounter Date: 2023   Encounter department: [facility]    Assessment & Plan     1. Continuous opioid dependence (HCC)  Assessment & Plan:  Patient presents today for a med recheck as he is requiring refills of his gabapentin 100mg for sleep and Tramadol 100mg Q6hrs. At last appointment, patient tested positive for THC on his saliva drug screen and reported alcohol use, prompting his PCP to decrease his total Tramadol dosage by 10%. Patient was receiving 240 pills every month, and was decreased to 216 pills.     Patient presents today for a refill. Patient is establishing with [facility] as he is frustrated with [facility] specialists. I discussed with him that he may switch to [facility], but he cannot see two different family medicine providers. He is understanding of this. His plan is to permanently switch to [facility].     As it is the end of the month and he is requiring refills of the Tramadol, I gave him enough pills to last him 2 weeks. Given a total of 112 pills today. [facility], so this will cover him in the meantime. Since he is on a high dose, he cannot be taken off of it abruptly. PDMP reviewed with no red flags. Has Narcan with instructions on when and how to use.     Obtained saliva drug testing today, as he states he cannot provide a urine sample in office today due to not having to urinate. He was honest with me and stated it will most likely be positive for THC and he is still using alcohol. I discussed with him that this is another breech of contract, which would result in his next dose being decreased by another 10%. He was instructed that if he were to return to us, his PCP that the opioid agreement is with would decrease his dose by 10% for his next refill. I had a long discussion with him that combining alcohol, THC and a controlled substance can lead to  respiratory depression and possible death, which is the reason why we will continue to cut back his dose as long as he reports usage of these substances. He is understanding of the risks, but states he uses the alcohol and THC to help with his pain as well. I instructed him that his dose would continue to be decreased until he completely eliminates these substances from his daily routine. He is understanding of this.     Patient will schedule with us if needed, but plans to permanently go to [facility].       Orders:  -     [facility] All Prescribed Meds  -     Amphetamine  -     Alprazolam  -     Clonazepam  -     Diazepam  -     Lorazepam  -     Oxazepam  -     Temazepam  -     Gabapentin  -     Pregabalin  -     Cocaine  -     Heroin  -     Buprenorphine  -     Levorphanol  -     Meperidine  -     Naltrexone  -     Fentanyl  -     Methadone  -     Oxycodone  -     Oxymorphone  -     Tapentadol  -     Tramadol  -     THC  -     Codeine, Hydrocodone, Hydromorphone, Morphine  -     Methylphenidate    2. Fibromyalgia  -     gabapentin (NEURONTIN) 100 mg capsule; Take 1 capsule (100 mg total) by mouth daily at bedtime  -     traMADol (ULTRAM) 50 mg tablet; Take 2 tablets (100 mg total) by mouth every 6 (six) hours as needed for moderate pain for up to 14 days    3. Starr-Danlos syndrome  -     traMADol (ULTRAM) 50 mg tablet; Take 2 tablets (100 mg total) by mouth every 6 (six) hours as needed for moderate pain for up to 14 days           Subjective      Patient presents today for a medication recheck as he is due for refills for gabapentin 100mg that he uses at bedtime and Tramadol. Patient's tramadol dosage was decreased by 10% at his last appointment, and reports the decreased dose has caused increased pain. He has continued to use alcohol and marijuana and states he will be positive for both substances if a drug test was obtained. He reports frustration with [facility] specialists, as they keep cancelling his  appointments, which is prompting him to switch to [facility] family medicine.         Review of Systems   HENT:  Negative for ear pain and hearing loss.    Respiratory:  Negative for shortness of breath.    Cardiovascular:  Negative for palpitations and leg swelling.   Gastrointestinal:  Negative for blood in stool.   Musculoskeletal:  Positive for back pain.        Chronic pain due to Ehler-Danlos   Skin:  Negative for color change.   Neurological:  Negative for seizures and syncope.   Hematological:  Does not bruise/bleed easily.   Psychiatric/Behavioral:  Positive for sleep disturbance. Negative for agitation, behavioral problems, confusion and dysphoric mood. The patient is nervous/anxious.    All other systems reviewed and are negative.      Current Outpatient Medications on File Prior to Visit   Medication Sig    albuterol (Ventolin HFA) 90 mcg/act inhaler Inhale 2 puffs as needed for wheezing    aspirin-acetaminophen-caffeine (EXCEDRIN MIGRAINE) 250-250-65 MG per tablet Take 3 tablets by mouth daily    buPROPion (Wellbutrin SR) 100 mg 12 hr tablet Take 1 tablet (100 mg total) by mouth 2 (two) times a day    fluticasone (FLONASE) 50 mcg/act nasal spray 1 spray into each nostril daily    gabapentin (NEURONTIN) 600 MG tablet Take 1 tablet (600 mg total) by mouth 3 (three) times a day    hydrOXYzine HCL (ATARAX) 25 mg tablet TAKE 1 TABLET (25 MG TOTAL) BY MOUTH DAILY AT BEDTIME AS NEEDED FOR ANXIETY (SLEEP)    naloxone (NARCAN) 4 mg/0.1 mL nasal spray Administer 1 spray into a nostril. If no response after 2-3 minutes, give another dose in the other nostril using a new spray.    triamcinolone (KENALOG) 0.1 % cream Apply topically 2 (two) times a day (Patient not taking: Reported on 9/29/2023)    [DISCONTINUED] gabapentin (NEURONTIN) 100 mg capsule Take 1 capsule (100 mg total) by mouth daily at bedtime    [DISCONTINUED] traMADol (ULTRAM) 50 mg tablet Take 2 tablets (100 mg total) by mouth every 6 (six) hours as needed  "for moderate pain       Objective     /82 (BP Location: Left arm, Patient Position: Sitting, Cuff Size: Standard)   Pulse 81   Temp 97.6 °F (36.4 °C) (Temporal)   Ht 5' 10\" (1.778 m)   Wt 74.8 kg (165 lb)   SpO2 99%   BMI 23.68 kg/m²     Physical Exam  Constitutional:       General: He is not in acute distress.     Appearance: Normal appearance. He is not ill-appearing or diaphoretic.   HENT:      Head: Normocephalic and atraumatic.   Cardiovascular:      Rate and Rhythm: Normal rate and regular rhythm.      Heart sounds: No murmur heard.  Pulmonary:      Effort: Pulmonary effort is normal. No respiratory distress.   Musculoskeletal:      Right lower leg: No edema.      Left lower leg: No edema.   Skin:     General: Skin is warm and dry.      Capillary Refill: Capillary refill takes less than 2 seconds.      Coloration: Skin is not cyanotic or pale.   Neurological:      General: No focal deficit present.      Mental Status: He is alert and oriented to person, place, and time.   Psychiatric:         Mood and Affect: Mood normal.         Behavior: Behavior normal. Behavior is cooperative.         Judgment: Judgment normal.       Kaitlynn Deleon PA-C    "

## 2023-12-29 NOTE — ASSESSMENT & PLAN NOTE
Patient presents today for a med recheck as he is requiring refills of his gabapentin 100mg for sleep and Tramadol 100mg Q6hrs. At last appointment, patient tested positive for THC on his saliva drug screen and reported alcohol use, prompting his PCP to decrease his total Tramadol dosage by 10%. Patient was receiving 240 pills every month, and was decreased to 216 pills.     Patient presents today for a refill. Patient is establishing with [facility] as he is frustrated with [facility] specialists. I discussed with him that he may switch to [facility], but he cannot see two different family medicine providers. He is understanding of this. His plan is to permanently switch to [facility].     As it is the end of the month and he is requiring refills of the Tramadol, I gave him enough pills to last him 2 weeks. Given a total of 112 pills today. He is seeing [facility] in 1 week, so this will cover him in the meantime. Since he is on a high dose, he cannot be taken off of it abruptly. PDMP reviewed with no red flags. Has Narcan with instructions on when and how to use.     Obtained saliva drug testing today, as he states he cannot provide a urine sample in office today due to not having to urinate. He was honest with me and stated it will most likely be positive for THC and he is still using alcohol. I discussed with him that this is another breech of contract, which would result in his next dose being decreased by another 10%. He was instructed that if he were to return to us, his PCP that the opioid agreement is with would decrease his dose by 10% for his next refill. I had a long discussion with him that combining alcohol, THC and a controlled substance can lead to respiratory depression and possible death, which is the reason why we will continue to cut back his dose as long as he reports usage of these substances. He is understanding of the risks, but states he uses the alcohol and THC to help with his pain as well. I instructed him that  his dose would continue to be decreased until he completely eliminates these substances from his daily routine. He is understanding of this.     Patient will schedule with us if needed, but plans to permanently go to [facility].

## 2024-01-09 ENCOUNTER — TELEPHONE (OUTPATIENT)
Age: 39
End: 2024-01-09

## 2024-01-09 NOTE — TELEPHONE ENCOUNTER
Yana from [facility] called and stated patient has results that could not be processed due to not having a provider listed. Yana would like a return call

## 2024-01-09 NOTE — TELEPHONE ENCOUNTER
JEFFRY for Yana to call back. We collected a [facility] specimen at his office visit so Kaitlynn, whom conducted the visit, is the ordering provider and it should be on his order, as an ordering provider is required to sign all EPIC orders.

## 2024-01-09 NOTE — TELEPHONE ENCOUNTER
It appears this patient is being seen by us as well as primary care at [facility]. I'm looping you both on this. Issue with specimen to be addressed separately

## 2024-01-10 ENCOUNTER — TELEPHONE (OUTPATIENT)
Dept: FAMILY MEDICINE CLINIC | Facility: CLINIC | Age: 39
End: 2024-01-10

## 2024-01-10 NOTE — TELEPHONE ENCOUNTER
Based on pt's chart, pt established care with [facility]    Bartolo Haywood PA-C (January 05, 2024 - Present)  NPI: [id number]  [phone] (Work)  [address] [id number]  Family Medicine  [facility]  [address] [id number]  Family Medicine    Please remove Sophie Rodriguez as PCP. Thank you.

## 2024-01-12 NOTE — TELEPHONE ENCOUNTER
Patient called to confirm he will no longer be seen in the office and has established care with [facility].

## 2024-01-19 NOTE — TELEPHONE ENCOUNTER
01/19/24 12:21 AM        The office's request has been received, reviewed, and the patient chart updated. The PCP has successfully been removed with a patient attribution note. This message will now be completed.        Thank you  Shruti Hernandez

## 2024-03-30 ENCOUNTER — APPOINTMENT (EMERGENCY)
Dept: RADIOLOGY | Facility: HOSPITAL | Age: 39
End: 2024-03-30
Payer: COMMERCIAL

## 2024-03-30 ENCOUNTER — HOSPITAL ENCOUNTER (EMERGENCY)
Facility: HOSPITAL | Age: 39
Discharge: HOME | End: 2024-03-30
Attending: EMERGENCY MEDICINE
Payer: COMMERCIAL

## 2024-03-30 ENCOUNTER — APPOINTMENT (EMERGENCY)
Dept: CT IMAGING | Facility: HOSPITAL | Age: 39
End: 2024-03-30
Payer: COMMERCIAL

## 2024-03-30 VITALS
SYSTOLIC BLOOD PRESSURE: 155 MMHG | RESPIRATION RATE: 22 BRPM | WEIGHT: 160.94 LBS | DIASTOLIC BLOOD PRESSURE: 106 MMHG | HEART RATE: 70 BPM | TEMPERATURE: 97.9 F | BODY MASS INDEX: 23.09 KG/M2 | OXYGEN SATURATION: 98 %

## 2024-03-30 DIAGNOSIS — Q24.8 PERICARDIAL CYST: ICD-10-CM

## 2024-03-30 DIAGNOSIS — K29.70 GASTRITIS: ICD-10-CM

## 2024-03-30 DIAGNOSIS — R10.9 ABDOMINAL PAIN: Primary | ICD-10-CM

## 2024-03-30 LAB
ALBUMIN SERPL BCP-MCNC: 4.6 G/DL (ref 3.5–5)
ALP SERPL-CCNC: 59 U/L (ref 34–104)
ALT SERPL W P-5'-P-CCNC: 16 U/L (ref 7–52)
ANION GAP SERPL CALCULATED.3IONS-SCNC: 7 MMOL/L (ref 4–13)
AST SERPL W P-5'-P-CCNC: 21 U/L (ref 13–39)
ATRIAL RATE: 91 BPM
BASOPHILS # BLD AUTO: 0.03 THOUSANDS/ÂΜL (ref 0–0.1)
BASOPHILS NFR BLD AUTO: 0 % (ref 0–1)
BILIRUB SERPL-MCNC: 0.66 MG/DL (ref 0.2–1)
BUN SERPL-MCNC: 11 MG/DL (ref 5–25)
CALCIUM SERPL-MCNC: 9.4 MG/DL (ref 8.4–10.2)
CARDIAC TROPONIN I PNL SERPL HS: 4 NG/L
CHLORIDE SERPL-SCNC: 98 MMOL/L (ref 96–108)
CO2 SERPL-SCNC: 29 MMOL/L (ref 21–32)
CREAT SERPL-MCNC: 1.19 MG/DL (ref 0.6–1.3)
EOSINOPHIL # BLD AUTO: 0.08 THOUSAND/ÂΜL (ref 0–0.61)
EOSINOPHIL NFR BLD AUTO: 1 % (ref 0–6)
ERYTHROCYTE [DISTWIDTH] IN BLOOD BY AUTOMATED COUNT: 11.8 % (ref 11.6–15.1)
GFR SERPL CREATININE-BSD FRML MDRD: 78 ML/MIN/1.73SQ M
GLUCOSE SERPL-MCNC: 127 MG/DL (ref 65–140)
HCT VFR BLD AUTO: 45 % (ref 36.5–49.3)
HGB BLD-MCNC: 15.9 G/DL (ref 12–17)
IMM GRANULOCYTES # BLD AUTO: 0.03 THOUSAND/UL (ref 0–0.2)
IMM GRANULOCYTES NFR BLD AUTO: 0 % (ref 0–2)
LIPASE SERPL-CCNC: 28 U/L (ref 11–82)
LYMPHOCYTES # BLD AUTO: 2.32 THOUSANDS/ÂΜL (ref 0.6–4.47)
LYMPHOCYTES NFR BLD AUTO: 25 % (ref 14–44)
MCH RBC QN AUTO: 31.9 PG (ref 26.8–34.3)
MCHC RBC AUTO-ENTMCNC: 35.3 G/DL (ref 31.4–37.4)
MCV RBC AUTO: 90 FL (ref 82–98)
MONOCYTES # BLD AUTO: 0.74 THOUSAND/ÂΜL (ref 0.17–1.22)
MONOCYTES NFR BLD AUTO: 8 % (ref 4–12)
NEUTROPHILS # BLD AUTO: 6.25 THOUSANDS/ÂΜL (ref 1.85–7.62)
NEUTS SEG NFR BLD AUTO: 66 % (ref 43–75)
NRBC BLD AUTO-RTO: 0 /100 WBCS
P AXIS: 77 DEGREES
PLATELET # BLD AUTO: 264 THOUSANDS/UL (ref 149–390)
PMV BLD AUTO: 9.3 FL (ref 8.9–12.7)
POTASSIUM SERPL-SCNC: 3.5 MMOL/L (ref 3.5–5.3)
PR INTERVAL: 124 MS
PROT SERPL-MCNC: 7 G/DL (ref 6.4–8.4)
QRS AXIS: 77 DEGREES
QRSD INTERVAL: 94 MS
QT INTERVAL: 352 MS
QTC INTERVAL: 432 MS
RBC # BLD AUTO: 4.99 MILLION/UL (ref 3.88–5.62)
SODIUM SERPL-SCNC: 134 MMOL/L (ref 135–147)
T WAVE AXIS: 66 DEGREES
VENTRICULAR RATE: 91 BPM
WBC # BLD AUTO: 9.45 THOUSAND/UL (ref 4.31–10.16)

## 2024-03-30 PROCEDURE — 85025 COMPLETE CBC W/AUTO DIFF WBC: CPT

## 2024-03-30 PROCEDURE — 96375 TX/PRO/DX INJ NEW DRUG ADDON: CPT

## 2024-03-30 PROCEDURE — 99285 EMERGENCY DEPT VISIT HI MDM: CPT

## 2024-03-30 PROCEDURE — C9113 INJ PANTOPRAZOLE SODIUM, VIA: HCPCS

## 2024-03-30 PROCEDURE — 93005 ELECTROCARDIOGRAM TRACING: CPT

## 2024-03-30 PROCEDURE — 71045 X-RAY EXAM CHEST 1 VIEW: CPT

## 2024-03-30 PROCEDURE — 96374 THER/PROPH/DIAG INJ IV PUSH: CPT

## 2024-03-30 PROCEDURE — 84484 ASSAY OF TROPONIN QUANT: CPT

## 2024-03-30 PROCEDURE — 83690 ASSAY OF LIPASE: CPT

## 2024-03-30 PROCEDURE — 36415 COLL VENOUS BLD VENIPUNCTURE: CPT

## 2024-03-30 PROCEDURE — 99284 EMERGENCY DEPT VISIT MOD MDM: CPT

## 2024-03-30 PROCEDURE — 80053 COMPREHEN METABOLIC PANEL: CPT

## 2024-03-30 PROCEDURE — 74177 CT ABD & PELVIS W/CONTRAST: CPT

## 2024-03-30 PROCEDURE — 96361 HYDRATE IV INFUSION ADD-ON: CPT

## 2024-03-30 RX ORDER — ONDANSETRON 2 MG/ML
4 INJECTION INTRAMUSCULAR; INTRAVENOUS ONCE
Status: COMPLETED | OUTPATIENT
Start: 2024-03-30 | End: 2024-03-30

## 2024-03-30 RX ORDER — PANTOPRAZOLE SODIUM 40 MG/10ML
40 INJECTION, POWDER, LYOPHILIZED, FOR SOLUTION INTRAVENOUS ONCE
Status: COMPLETED | OUTPATIENT
Start: 2024-03-30 | End: 2024-03-30

## 2024-03-30 RX ORDER — LIDOCAINE 50 MG/G
1 PATCH TOPICAL ONCE
Status: DISCONTINUED | OUTPATIENT
Start: 2024-03-30 | End: 2024-03-30 | Stop reason: HOSPADM

## 2024-03-30 RX ORDER — HYDROMORPHONE HCL/PF 1 MG/ML
0.5 SYRINGE (ML) INJECTION ONCE
Status: COMPLETED | OUTPATIENT
Start: 2024-03-30 | End: 2024-03-30

## 2024-03-30 RX ORDER — KETOROLAC TROMETHAMINE 30 MG/ML
15 INJECTION, SOLUTION INTRAMUSCULAR; INTRAVENOUS ONCE
Status: COMPLETED | OUTPATIENT
Start: 2024-03-30 | End: 2024-03-30

## 2024-03-30 RX ADMIN — SODIUM CHLORIDE 1000 ML: 0.9 INJECTION, SOLUTION INTRAVENOUS at 13:41

## 2024-03-30 RX ADMIN — PANTOPRAZOLE SODIUM 40 MG: 40 INJECTION, POWDER, FOR SOLUTION INTRAVENOUS at 13:44

## 2024-03-30 RX ADMIN — KETOROLAC TROMETHAMINE 15 MG: 30 INJECTION, SOLUTION INTRAMUSCULAR; INTRAVENOUS at 13:44

## 2024-03-30 RX ADMIN — IOHEXOL 100 ML: 350 INJECTION, SOLUTION INTRAVENOUS at 14:26

## 2024-03-30 RX ADMIN — ONDANSETRON 4 MG: 2 INJECTION INTRAMUSCULAR; INTRAVENOUS at 13:44

## 2024-03-30 RX ADMIN — HYDROMORPHONE HYDROCHLORIDE 0.5 MG: 1 INJECTION, SOLUTION INTRAMUSCULAR; INTRAVENOUS; SUBCUTANEOUS at 15:35

## 2024-03-30 RX ADMIN — LIDOCAINE 5% 1 PATCH: 700 PATCH TOPICAL at 15:35

## 2024-03-30 NOTE — ED PROVIDER NOTES
History  Chief Complaint   Patient presents with    Abdominal Pain     Thursday night intermittent abdominal pain started with N/V. Patient only had one episode of vomiting and has not eaten much.     This is a 38-year-old male who presents to the ED for evaluation of abdominal pain x 2 days.  Patient reports epigastric abdominal pain that he describes as a constant ache, states the pain occasionally radiates into his chest, otherwise nonradiating.  He does report associated nausea, states he did have 1 episode of vomiting a day ago though denies any vomiting today.  He reports decreased p.o. intake though states he has still been able to eat, reports last eating breakfast earlier today, denies difficulty tolerating clear liquids.  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  He denies any sudden onset pain after lifting though reports that he did wake up with the pain.  States the pain is better when lying flat and with rest, worse with movement.  He denies any other acute concerns or complaints at this time, denies any headaches, vision changes, lightheadedness, SOB, diarrhea or stool changes, dysuria, denies testicular or scrotal pain or swelling.        Prior to Admission Medications   Prescriptions Last Dose Informant Patient Reported? Taking?   albuterol (Ventolin HFA) 90 mcg/act inhaler  Self No No   Sig: Inhale 2 puffs as needed for wheezing   aspirin-acetaminophen-caffeine (EXCEDRIN MIGRAINE) 250-250-65 MG per tablet  Self Yes No   Sig: Take 3 tablets by mouth daily   buPROPion (Wellbutrin SR) 100 mg 12 hr tablet   No No   Sig: Take 1 tablet (100 mg total) by mouth 2 (two) times a day   fluticasone (FLONASE) 50 mcg/act nasal spray  Self No No   Si spray into each nostril daily   gabapentin (NEURONTIN) 100 mg capsule   No No   Sig: Take 1 capsule (100 mg total) by mouth daily at bedtime   gabapentin (NEURONTIN) 600 MG tablet   No No   Sig:  Take 1 tablet (600 mg total) by mouth 3 (three) times a day   hydrOXYzine HCL (ATARAX) 25 mg tablet   No No   Sig: TAKE 1 TABLET (25 MG TOTAL) BY MOUTH DAILY AT BEDTIME AS NEEDED FOR ANXIETY (SLEEP)   naloxone (NARCAN) 4 mg/0.1 mL nasal spray   No No   Sig: Administer 1 spray into a nostril. If no response after 2-3 minutes, give another dose in the other nostril using a new spray.   triamcinolone (KENALOG) 0.1 % cream  Self No No   Sig: Apply topically 2 (two) times a day   Patient not taking: Reported on 9/29/2023      Facility-Administered Medications: None       Past Medical History:   Diagnosis Date    Fatigue     last assessed: 7/1/13       Past Surgical History:   Procedure Laterality Date    MANDIBLE SURGERY         Family History   Problem Relation Age of Onset    Hypertension Maternal Grandmother     Depression Brother      I have reviewed and agree with the history as documented.    E-Cigarette/Vaping    E-Cigarette Use Never User      E-Cigarette/Vaping Substances     Social History     Tobacco Use    Smoking status: Former     Current packs/day: 0.75     Average packs/day: 0.8 packs/day for 17.2 years (12.9 ttl pk-yrs)     Types: Cigarettes     Start date: 1/27/2007     Passive exposure: Never    Smokeless tobacco: Never    Tobacco comments:     smokes a cigar daily   Vaping Use    Vaping status: Never Used   Substance Use Topics    Alcohol use: Yes     Alcohol/week: 9.0 standard drinks of alcohol     Types: 9 Standard drinks or equivalent per week    Drug use: Not Currently     Types: Marijuana       Review of Systems   Constitutional:  Negative for chills and fever.   Eyes:  Negative for photophobia and visual disturbance.   Respiratory:  Negative for cough and shortness of breath.    Cardiovascular:  Negative for chest pain and palpitations.   Gastrointestinal:  Negative for abdominal pain, diarrhea, nausea and vomiting.   Genitourinary:  Negative for difficulty urinating, dysuria, flank pain,  frequency, hematuria, penile discharge, penile pain, penile swelling, scrotal swelling and testicular pain.   Musculoskeletal:  Negative for neck pain and neck stiffness.   Neurological:  Negative for dizziness, light-headedness and headaches.       Physical Exam  Physical Exam  Vitals and nursing note reviewed.   Constitutional:       General: He is not in acute distress.     Appearance: He is well-developed. He is not ill-appearing or diaphoretic.      Comments: Well-appearing patient exam.  Does not appear to be any acute distress at time of ED evaluation.   HENT:      Head: Normocephalic and atraumatic.   Eyes:      Conjunctiva/sclera: Conjunctivae normal.   Cardiovascular:      Rate and Rhythm: Normal rate and regular rhythm.      Heart sounds: No murmur heard.  Pulmonary:      Effort: Pulmonary effort is normal. No respiratory distress.      Breath sounds: Normal breath sounds.   Abdominal:      General: Abdomen is flat. There is no distension.      Palpations: Abdomen is soft.      Tenderness: There is generalized abdominal tenderness (Generalized abdominal tenderness, worst in epigastrium.) and tenderness in the epigastric area. There is no right CVA tenderness, left CVA tenderness or guarding. Negative signs include Mueller's sign.   Musculoskeletal:         General: No swelling.      Cervical back: Normal range of motion and neck supple.   Skin:     General: Skin is warm and dry.      Capillary Refill: Capillary refill takes less than 2 seconds.   Neurological:      General: No focal deficit present.      Mental Status: He is alert and oriented to person, place, and time.   Psychiatric:         Mood and Affect: Mood normal.         Vital Signs  ED Triage Vitals [03/30/24 1312]   Temperature Pulse Respirations Blood Pressure SpO2   97.9 °F (36.6 °C) 87 18 (!) 175/116 98 %      Temp Source Heart Rate Source Patient Position - Orthostatic VS BP Location FiO2 (%)   Oral Monitor Sitting Right arm --      Pain  Score       7           Vitals:    03/30/24 1312 03/30/24 1528   BP: (!) 175/116 (!) 155/106   Pulse: 87 70   Patient Position - Orthostatic VS: Sitting Lying         Visual Acuity      ED Medications  Medications   lidocaine (LIDODERM) 5 % patch 1 patch (1 patch Topical Medication Applied 3/30/24 1535)   sodium chloride 0.9 % bolus 1,000 mL (0 mL Intravenous Stopped 3/30/24 1434)   ketorolac (TORADOL) injection 15 mg (15 mg Intravenous Given 3/30/24 1344)   pantoprazole (PROTONIX) injection 40 mg (40 mg Intravenous Given 3/30/24 1344)   ondansetron (ZOFRAN) injection 4 mg (4 mg Intravenous Given 3/30/24 1344)   iohexol (OMNIPAQUE) 350 MG/ML injection (MULTI-DOSE) 100 mL (100 mL Intravenous Given 3/30/24 1426)   HYDROmorphone (DILAUDID) injection 0.5 mg (0.5 mg Intravenous Given 3/30/24 1535)       Diagnostic Studies  Results Reviewed       Procedure Component Value Units Date/Time    HS Troponin 0hr (reflex protocol) [[id number]]  (Normal) Collected: 03/30/24 1340    Lab Status: Final result Specimen: Blood from Arm, Left Updated: 03/30/24 1409     hs TnI 0hr 4 ng/L     Comprehensive metabolic panel [[id number]]  (Abnormal) Collected: 03/30/24 1340    Lab Status: Final result Specimen: Blood from Arm, Left Updated: 03/30/24 1403     Sodium 134 mmol/L      Potassium 3.5 mmol/L      Chloride 98 mmol/L      CO2 29 mmol/L      ANION GAP 7 mmol/L      BUN 11 mg/dL      Creatinine 1.19 mg/dL      Glucose 127 mg/dL      Calcium 9.4 mg/dL      AST 21 U/L      ALT 16 U/L      Alkaline Phosphatase 59 U/L      Total Protein 7.0 g/dL      Albumin 4.6 g/dL      Total Bilirubin 0.66 mg/dL      eGFR 78 ml/min/1.73sq m     Narrative:      National Kidney Disease Foundation guidelines for Chronic Kidney Disease (CKD):     Stage 1 with normal or high GFR (GFR > 90 mL/min/1.73 square meters)    Stage 2 Mild CKD (GFR = 60-89 mL/min/1.73 square meters)    Stage 3A Moderate CKD (GFR = 45-59 mL/min/1.73 square meters)    Stage 3B Moderate  CKD (GFR = 30-44 mL/min/1.73 square meters)    Stage 4 Severe CKD (GFR = 15-29 mL/min/1.73 square meters)    Stage 5 End Stage CKD (GFR <15 mL/min/1.73 square meters)  Note: GFR calculation is accurate only with a steady state creatinine    Lipase [[id number]]  (Normal) Collected: 03/30/24 1340    Lab Status: Final result Specimen: Blood from Arm, Left Updated: 03/30/24 1403     Lipase 28 u/L     CBC and differential [[id number]] Collected: 03/30/24 1340    Lab Status: Final result Specimen: Blood from Arm, Left Updated: 03/30/24 1347     WBC 9.45 Thousand/uL      RBC 4.99 Million/uL      Hemoglobin 15.9 g/dL      Hematocrit 45.0 %      MCV 90 fL      MCH 31.9 pg      MCHC 35.3 g/dL      RDW 11.8 %      MPV 9.3 fL      Platelets 264 Thousands/uL      nRBC 0 /100 WBCs      Neutrophils Relative 66 %      Immature Grans % 0 %      Lymphocytes Relative 25 %      Monocytes Relative 8 %      Eosinophils Relative 1 %      Basophils Relative 0 %      Neutrophils Absolute 6.25 Thousands/µL      Absolute Immature Grans 0.03 Thousand/uL      Absolute Lymphocytes 2.32 Thousands/µL      Absolute Monocytes 0.74 Thousand/µL      Eosinophils Absolute 0.08 Thousand/µL      Basophils Absolute 0.03 Thousands/µL                    CT abdomen pelvis with contrast   Final Result by Oral Bowie MD (03/30 9313)      No acute intra-abdominal abnormality. No free air or free fluid.      Mild circumferential wall thickening of the gastric antrum which may be secondary to under distention versus a gastritis. If there is clinical concern, a nonemergent upper GI series or endoscopy could be performed for further evaluation.      Pericardial cyst as described above, increased in size when compared to a CT chest dated June 22, 2012.      Mildly distended urinary bladder.      Workstation performed: [id number]         XR chest 1 view portable   ED Interpretation by Tim Duncan PA-C (03/30 7746)   ED Interpretation: No acute cardiopulmonary  disease.                 Procedures  ECG 12 Lead Documentation Only    Date/Time: 3/30/2024 2:36 PM    Performed by: Tim Duncan PA-C  Authorized by: Tim Duncan PA-C    Interpretation:     Interpretation: normal    Rate:     ECG rate:  91    ECG rate assessment: normal    Rhythm:     Rhythm: sinus rhythm    Ectopy:     Ectopy: none    QRS:     QRS axis:  Normal  Conduction:     Conduction: normal    ST segments:     ST segments:  Normal  T waves:     T waves: normal             ED Course  ED Course as of 03/30/24 1631   Sat Mar 30, 2024   1530 Patient reports improvement in nausea status post Zofran and IV fluids.  Does report his pain feels slightly improved though is still present.  At this point in time workup has been unremarkable, CT was still pending.  Patient agreeable to one-time dose of Dilaudid, states if discharged his wife will be driving him home.   1607 Patient reports improvement in abdominal pain at this time.   1611 CT abdomen pelvis with contrast                               SBIRT 20yo+      Flowsheet Row Most Recent Value   Initial Alcohol Screen: US AUDIT-C     1. How often do you have a drink containing alcohol? 0 Filed at: 03/30/2024 1324   2. How many drinks containing alcohol do you have on a typical day you are drinking?  0 Filed at: 03/30/2024 1324   3a. Male UNDER 65: How often do you have five or more drinks on one occasion? 0 Filed at: 03/30/2024 1324   Audit-C Score 0 Filed at: 03/30/2024 1324   KINDRA: How many times in the past year have you...    Used an illegal drug or used a prescription medication for non-medical reasons? Never Filed at: 03/30/2024 1324                      Medical Decision Making  38-year-old male presents the ED for evaluation of abdominal pain with nausea.  Patient afebrile in ED.  Labs acutely unremarkable, CT abdomen and pelvis showed a possible gastritis with incidental findings.  All findings discussed with patient.   Patient given ambulatory referral to  GI for further evaluation and management, also advised to follow-up with his PCP soon as possible for further evaluation and management of gastritis/abdominal pain as well as incidental findings on CT.  Strict ED return precautions discussed with patient.  Patient verbalized understanding and agreement with plan.    Amount and/or Complexity of Data Reviewed  Labs: ordered.  Radiology: ordered and independent interpretation performed. Decision-making details documented in ED Course.    Risk  Prescription drug management.             Disposition  Final diagnoses:   Abdominal pain   Gastritis   Pericardial cyst     Time reflects when diagnosis was documented in both MDM as applicable and the Disposition within this note       Time User Action Codes Description Comment    3/30/2024  4:14 PM Tim Duncan [R10.9] Abdominal pain     3/30/2024  4:14 PM Tim Duncan [K29.70] Gastritis     3/30/2024  4:15 PM Tim Duncan [Q24.8] Pericardial cyst           ED Disposition       ED Disposition   Discharge    Condition   Stable    Date/Time   Sat Mar 30, 2024 1614    Comment   Parviz Hernandez discharge to home/self care.                   Follow-up Information       Follow up With Specialties Details Why Contact Info Additional Information    [facility] Schedule an appointment as soon as possible for a visit  For re-check [address] [facility] [address]  [phone]       [facility] Schedule an appointment as soon as possible for a visit  For re-check [address]  [address]  [facility] [address] [phone] [facility] [place], [facility] [address], [place], [place], [address] [phone]            Discharge Medication List as of 3/30/2024  4:16 PM        CONTINUE these medications which have NOT CHANGED    Details   albuterol (Ventolin HFA) 90 mcg/act inhaler Inhale  2 puffs as needed for wheezing, Starting Mon 3/28/2022, Normal      aspirin-acetaminophen-caffeine (EXCEDRIN MIGRAINE) 250-250-65 MG per tablet Take 3 tablets by mouth daily, Starting Tue 7/1/2014, Historical Med      buPROPion (Wellbutrin SR) 100 mg 12 hr tablet Take 1 tablet (100 mg total) by mouth 2 (two) times a day, Starting Tue 11/21/2023, Normal      fluticasone (FLONASE) 50 mcg/act nasal spray 1 spray into each nostril daily, Starting Fri 4/22/2022, Normal      gabapentin (NEURONTIN) 100 mg capsule Take 1 capsule (100 mg total) by mouth daily at bedtime, Starting Fri 12/29/2023, Normal      gabapentin (NEURONTIN) 600 MG tablet Take 1 tablet (600 mg total) by mouth 3 (three) times a day, Starting Wed 8/16/2023, Normal      hydrOXYzine HCL (ATARAX) 25 mg tablet TAKE 1 TABLET (25 MG TOTAL) BY MOUTH DAILY AT BEDTIME AS NEEDED FOR ANXIETY (SLEEP), Starting Wed 9/27/2023, Normal      naloxone (NARCAN) 4 mg/0.1 mL nasal spray Administer 1 spray into a nostril. If no response after 2-3 minutes, give another dose in the other nostril using a new spray., Normal      triamcinolone (KENALOG) 0.1 % cream Apply topically 2 (two) times a day, Starting Tue 2/7/2023, Normal                 PDMP Review         Value Time User    PDMP Reviewed  Yes 12/29/2023 10:57 AM Kaitlynn Deleon PA-C            ED Provider  Electronically Signed by             Tim Duncan PA-C  03/30/24 2766

## 2024-03-30 NOTE — DISCHARGE INSTRUCTIONS
Please follow-up with GI as well as your PCP for further evaluation and management.  Return to the ED if you develop any worsening symptoms as discussed prior to your discharge.

## 2024-03-30 NOTE — Clinical Note
Parviz Hernandez was seen and treated in our emergency department on 3/30/2024.                Diagnosis:     Parviz  .    He may return on this date: 04/02/2024         If you have any questions or concerns, please don't hesitate to call.      Tim Duncan PA-C    ______________________________           _______________          _______________  Hospital Representative                              Date                                Time

## 2024-04-01 LAB
ATRIAL RATE: 91 BPM
P AXIS: 77 DEGREES
PR INTERVAL: 124 MS
QRS AXIS: 77 DEGREES
QRSD INTERVAL: 94 MS
QT INTERVAL: 352 MS
QTC INTERVAL: 432 MS
T WAVE AXIS: 66 DEGREES
VENTRICULAR RATE: 91 BPM

## 2024-04-01 PROCEDURE — 93010 ELECTROCARDIOGRAM REPORT: CPT | Performed by: INTERNAL MEDICINE
